# Patient Record
Sex: FEMALE | URBAN - METROPOLITAN AREA
[De-identification: names, ages, dates, MRNs, and addresses within clinical notes are randomized per-mention and may not be internally consistent; named-entity substitution may affect disease eponyms.]

---

## 2023-04-28 ENCOUNTER — APPOINTMENT (RX ONLY)
Dept: URBAN - METROPOLITAN AREA CLINIC 15 | Facility: CLINIC | Age: 58
Setting detail: DERMATOLOGY
End: 2023-04-28

## 2023-04-28 DIAGNOSIS — D18.0 HEMANGIOMA: ICD-10-CM

## 2023-04-28 DIAGNOSIS — L82.1 OTHER SEBORRHEIC KERATOSIS: ICD-10-CM

## 2023-04-28 DIAGNOSIS — L81.4 OTHER MELANIN HYPERPIGMENTATION: ICD-10-CM

## 2023-04-28 DIAGNOSIS — D22 MELANOCYTIC NEVI: ICD-10-CM

## 2023-04-28 PROBLEM — D22.39 MELANOCYTIC NEVI OF OTHER PARTS OF FACE: Status: ACTIVE | Noted: 2023-04-28

## 2023-04-28 PROBLEM — D22.62 MELANOCYTIC NEVI OF LEFT UPPER LIMB, INCLUDING SHOULDER: Status: ACTIVE | Noted: 2023-04-28

## 2023-04-28 PROBLEM — D22.5 MELANOCYTIC NEVI OF TRUNK: Status: ACTIVE | Noted: 2023-04-28

## 2023-04-28 PROBLEM — D48.5 NEOPLASM OF UNCERTAIN BEHAVIOR OF SKIN: Status: ACTIVE | Noted: 2023-04-28

## 2023-04-28 PROBLEM — D18.01 HEMANGIOMA OF SKIN AND SUBCUTANEOUS TISSUE: Status: ACTIVE | Noted: 2023-04-28

## 2023-04-28 PROBLEM — D22.61 MELANOCYTIC NEVI OF RIGHT UPPER LIMB, INCLUDING SHOULDER: Status: ACTIVE | Noted: 2023-04-28

## 2023-04-28 PROCEDURE — 11102 TANGNTL BX SKIN SINGLE LES: CPT

## 2023-04-28 PROCEDURE — ? BIOPSY BY SHAVE METHOD

## 2023-04-28 PROCEDURE — 11103 TANGNTL BX SKIN EA SEP/ADDL: CPT

## 2023-04-28 PROCEDURE — ? COUNSELING

## 2023-04-28 PROCEDURE — 99203 OFFICE O/P NEW LOW 30 MIN: CPT | Mod: 25

## 2023-04-28 ASSESSMENT — LOCATION DETAILED DESCRIPTION DERM
LOCATION DETAILED: LEFT LATERAL ABDOMEN
LOCATION DETAILED: RIGHT ANTERIOR DISTAL UPPER ARM
LOCATION DETAILED: RIGHT ANTECUBITAL SKIN
LOCATION DETAILED: PERIUMBILICAL SKIN
LOCATION DETAILED: LOWER STERNUM
LOCATION DETAILED: RIGHT INFERIOR ANTERIOR NECK
LOCATION DETAILED: RIGHT VENTRAL PROXIMAL FOREARM
LOCATION DETAILED: RIGHT MEDIAL UPPER BACK
LOCATION DETAILED: LEFT INFERIOR MEDIAL FOREHEAD
LOCATION DETAILED: LEFT VENTRAL PROXIMAL FOREARM
LOCATION DETAILED: LEFT ANTECUBITAL SKIN
LOCATION DETAILED: SUBXIPHOID
LOCATION DETAILED: RIGHT INFERIOR FOREHEAD
LOCATION DETAILED: RIGHT INFERIOR MEDIAL UPPER BACK
LOCATION DETAILED: LEFT CLAVICULAR NECK
LOCATION DETAILED: RIGHT INFERIOR UPPER BACK
LOCATION DETAILED: SUPERIOR LUMBAR SPINE
LOCATION DETAILED: LEFT VENTRAL DISTAL FOREARM
LOCATION DETAILED: LEFT INFERIOR LATERAL NECK
LOCATION DETAILED: RIGHT CHIN

## 2023-04-28 ASSESSMENT — LOCATION SIMPLE DESCRIPTION DERM
LOCATION SIMPLE: CHEST
LOCATION SIMPLE: LEFT FOREHEAD
LOCATION SIMPLE: LEFT UPPER ARM
LOCATION SIMPLE: LEFT FOREARM
LOCATION SIMPLE: CHIN
LOCATION SIMPLE: RIGHT FOREARM
LOCATION SIMPLE: RIGHT UPPER BACK
LOCATION SIMPLE: ABDOMEN
LOCATION SIMPLE: RIGHT UPPER ARM
LOCATION SIMPLE: LOWER BACK
LOCATION SIMPLE: LEFT ANTERIOR NECK
LOCATION SIMPLE: RIGHT ANTERIOR NECK
LOCATION SIMPLE: RIGHT FOREHEAD

## 2023-04-28 ASSESSMENT — LOCATION ZONE DERM
LOCATION ZONE: ARM
LOCATION ZONE: NECK
LOCATION ZONE: FACE
LOCATION ZONE: TRUNK

## 2023-04-28 NOTE — PROCEDURE: BIOPSY BY SHAVE METHOD
Detail Level: Detailed
Depth Of Biopsy: dermis
Was A Bandage Applied: Yes
Size Of Lesion In Cm: 0.8
X Size Of Lesion In Cm: 0
Biopsy Type: H and E
Biopsy Method: Personna blade
Anesthesia Type: 2% lidocaine with epinephrine and a 1:10 solution of 8.4% sodium bicarbonate
Anesthesia Volume In Cc: 1
Hemostasis: Electrocautery
Wound Care: Vaseline
Dressing: Band-Aid
Destruction After The Procedure: No
Type Of Destruction Used: Electrodesiccation
Curettage Text: The wound bed was treated with curettage after the biopsy was performed.
Cryotherapy Text: The wound bed was treated with cryotherapy after the biopsy was performed.
Electrodesiccation Text: The wound bed was treated with electrodesiccation after the biopsy was performed.
Electrodesiccation And Curettage Text: The wound bed was treated with electrodesiccation and curettage after the biopsy was performed.
Silver Nitrate Text: The wound bed was treated with silver nitrate after the biopsy was performed.
Lab: [id number]
Lab Facility: 
Consent: Verbal consent was obtained and risks were reviewed including but not limited to scarring, infection, bleeding, scabbing, incomplete removal, nerve damage and allergy to anesthesia.
Post-Care Instructions: I reviewed with the patient in detail post-care instructions. Patient is to keep the biopsy site dry overnight, and then apply bacitracin/petroleum  twice daily until healed. Patient may apply hydrogen peroxide soaks to remove any crusting.
Notification Instructions: Patient will be notified of biopsy results. However, patient instructed to call the office if not contacted within 2 weeks.
Billing Type: Third-Party Bill
Information: Selecting Yes will display possible errors in your note based on the variables you have selected. This validation is only offered as a suggestion for you. PLEASE NOTE THAT THE VALIDATION TEXT WILL BE REMOVED WHEN YOU FINALIZE YOUR NOTE. IF YOU WANT TO FAX A PRELIMINARY NOTE YOU WILL NEED TO TOGGLE THIS TO 'NO' IF YOU DO NOT WANT IT IN YOUR FAXED NOTE.
Size Of Lesion In Cm: 0.4
Size Of Lesion In Cm: 0.7
Anesthesia Type: 1% lidocaine with epinephrine
Size Of Lesion In Cm: 0.6
Consent: Written consent was obtained and risks were reviewed including but not limited to scarring, infection, bleeding, scabbing, incomplete removal, nerve damage and allergy to anesthesia.

## 2023-06-26 ENCOUNTER — OFFICE VISIT (OUTPATIENT)
Dept: URGENT CARE | Facility: CLINIC | Age: 58
End: 2023-06-26
Payer: COMMERCIAL

## 2023-06-26 VITALS
OXYGEN SATURATION: 96 % | DIASTOLIC BLOOD PRESSURE: 124 MMHG | HEART RATE: 82 BPM | SYSTOLIC BLOOD PRESSURE: 168 MMHG | BODY MASS INDEX: 31.39 KG/M2 | WEIGHT: 200 LBS | RESPIRATION RATE: 18 BRPM | HEIGHT: 67 IN | TEMPERATURE: 98.2 F

## 2023-06-26 DIAGNOSIS — R03.0 ELEVATED BLOOD PRESSURE READING: ICD-10-CM

## 2023-06-26 DIAGNOSIS — R51.9 NONINTRACTABLE HEADACHE, UNSPECIFIED CHRONICITY PATTERN, UNSPECIFIED HEADACHE TYPE: ICD-10-CM

## 2023-06-26 LAB
FLUAV RNA SPEC QL NAA+PROBE: NEGATIVE
FLUBV RNA SPEC QL NAA+PROBE: NEGATIVE
RSV RNA SPEC QL NAA+PROBE: NEGATIVE
SARS-COV-2 RNA RESP QL NAA+PROBE: NEGATIVE

## 2023-06-26 PROCEDURE — 3077F SYST BP >= 140 MM HG: CPT | Performed by: NURSE PRACTITIONER

## 2023-06-26 PROCEDURE — 99203 OFFICE O/P NEW LOW 30 MIN: CPT | Performed by: NURSE PRACTITIONER

## 2023-06-26 PROCEDURE — 0241U POCT CEPHEID COV-2, FLU A/B, RSV - PCR: CPT | Performed by: NURSE PRACTITIONER

## 2023-06-26 PROCEDURE — 3080F DIAST BP >= 90 MM HG: CPT | Performed by: NURSE PRACTITIONER

## 2023-06-26 RX ORDER — METHYLPREDNISOLONE 4 MG/1
TABLET ORAL
Qty: 21 TABLET | Refills: 0 | Status: ON HOLD | OUTPATIENT
Start: 2023-06-26 | End: 2023-06-30

## 2023-06-26 RX ORDER — RIZATRIPTAN BENZOATE 10 MG/1
10 TABLET ORAL
Status: ON HOLD | COMMUNITY
Start: 2023-04-19 | End: 2023-08-19

## 2023-06-29 ENCOUNTER — HOSPITAL ENCOUNTER (OUTPATIENT)
Facility: MEDICAL CENTER | Age: 58
End: 2023-06-30
Attending: EMERGENCY MEDICINE | Admitting: HOSPITALIST
Payer: COMMERCIAL

## 2023-06-29 ENCOUNTER — APPOINTMENT (OUTPATIENT)
Dept: RADIOLOGY | Facility: MEDICAL CENTER | Age: 58
End: 2023-06-29
Attending: EMERGENCY MEDICINE
Payer: COMMERCIAL

## 2023-06-29 DIAGNOSIS — R07.9 CHEST PAIN, UNSPECIFIED TYPE: ICD-10-CM

## 2023-06-29 PROBLEM — G43.909 MIGRAINE: Status: ACTIVE | Noted: 2023-06-29

## 2023-06-29 PROBLEM — I16.0 HYPERTENSIVE URGENCY: Status: ACTIVE | Noted: 2023-06-29

## 2023-06-29 PROBLEM — D72.819 LEUKOPENIA: Status: ACTIVE | Noted: 2023-06-29

## 2023-06-29 LAB
ALBUMIN SERPL BCP-MCNC: 4.2 G/DL (ref 3.2–4.9)
ALBUMIN/GLOB SERPL: 1.5 G/DL
ALP SERPL-CCNC: 76 U/L (ref 30–99)
ALT SERPL-CCNC: 14 U/L (ref 2–50)
AMPHET UR QL SCN: NEGATIVE
ANION GAP SERPL CALC-SCNC: 14 MMOL/L (ref 7–16)
AST SERPL-CCNC: 14 U/L (ref 12–45)
BARBITURATES UR QL SCN: NEGATIVE
BASOPHILS # BLD AUTO: 0.3 % (ref 0–1.8)
BASOPHILS # BLD: 0.01 K/UL (ref 0–0.12)
BENZODIAZ UR QL SCN: NEGATIVE
BILIRUB SERPL-MCNC: 0.2 MG/DL (ref 0.1–1.5)
BUN SERPL-MCNC: 18 MG/DL (ref 8–22)
BZE UR QL SCN: NEGATIVE
CALCIUM ALBUM COR SERPL-MCNC: 9 MG/DL (ref 8.5–10.5)
CALCIUM SERPL-MCNC: 9.2 MG/DL (ref 8.4–10.2)
CANNABINOIDS UR QL SCN: NEGATIVE
CHLORIDE SERPL-SCNC: 104 MMOL/L (ref 96–112)
CO2 SERPL-SCNC: 21 MMOL/L (ref 20–33)
CREAT SERPL-MCNC: 0.61 MG/DL (ref 0.5–1.4)
EKG IMPRESSION: NORMAL
EOSINOPHIL # BLD AUTO: 0.04 K/UL (ref 0–0.51)
EOSINOPHIL NFR BLD: 1.2 % (ref 0–6.9)
ERYTHROCYTE [DISTWIDTH] IN BLOOD BY AUTOMATED COUNT: 45.7 FL (ref 35.9–50)
GFR SERPLBLD CREATININE-BSD FMLA CKD-EPI: 105 ML/MIN/1.73 M 2
GLOBULIN SER CALC-MCNC: 2.8 G/DL (ref 1.9–3.5)
GLUCOSE SERPL-MCNC: 100 MG/DL (ref 65–99)
HCT VFR BLD AUTO: 39.8 % (ref 37–47)
HGB BLD-MCNC: 13.2 G/DL (ref 12–16)
IMM GRANULOCYTES # BLD AUTO: 0.01 K/UL (ref 0–0.11)
IMM GRANULOCYTES NFR BLD AUTO: 0.3 % (ref 0–0.9)
LYMPHOCYTES # BLD AUTO: 1.07 K/UL (ref 1–4.8)
LYMPHOCYTES NFR BLD: 32.4 % (ref 22–41)
MAGNESIUM SERPL-MCNC: 2 MG/DL (ref 1.5–2.5)
MCH RBC QN AUTO: 29.9 PG (ref 27–33)
MCHC RBC AUTO-ENTMCNC: 33.2 G/DL (ref 32.2–35.5)
MCV RBC AUTO: 90 FL (ref 81.4–97.8)
METHADONE UR QL SCN: NEGATIVE
MONOCYTES # BLD AUTO: 0.45 K/UL (ref 0–0.85)
MONOCYTES NFR BLD AUTO: 13.6 % (ref 0–13.4)
NEUTROPHILS # BLD AUTO: 1.72 K/UL (ref 1.82–7.42)
NEUTROPHILS NFR BLD: 52.2 % (ref 44–72)
NRBC # BLD AUTO: 0 K/UL
NRBC BLD-RTO: 0 /100 WBC (ref 0–0.2)
NT-PROBNP SERPL IA-MCNC: 119 PG/ML (ref 0–125)
OPIATES UR QL SCN: NEGATIVE
OXYCODONE UR QL SCN: NEGATIVE
PCP UR QL SCN: NEGATIVE
PHOSPHATE SERPL-MCNC: 3 MG/DL (ref 2.5–4.5)
PLATELET # BLD AUTO: 193 K/UL (ref 164–446)
PMV BLD AUTO: 9.8 FL (ref 9–12.9)
POTASSIUM SERPL-SCNC: 3.8 MMOL/L (ref 3.6–5.5)
PROPOXYPH UR QL SCN: NEGATIVE
PROT SERPL-MCNC: 7 G/DL (ref 6–8.2)
RBC # BLD AUTO: 4.42 M/UL (ref 4.2–5.4)
SODIUM SERPL-SCNC: 139 MMOL/L (ref 135–145)
TROPONIN T SERPL-MCNC: 6 NG/L (ref 6–19)
TROPONIN T SERPL-MCNC: 7 NG/L (ref 6–19)
WBC # BLD AUTO: 3.3 K/UL (ref 4.8–10.8)

## 2023-06-29 PROCEDURE — 99285 EMERGENCY DEPT VISIT HI MDM: CPT

## 2023-06-29 PROCEDURE — 93005 ELECTROCARDIOGRAM TRACING: CPT

## 2023-06-29 PROCEDURE — 85025 COMPLETE CBC W/AUTO DIFF WBC: CPT

## 2023-06-29 PROCEDURE — 71045 X-RAY EXAM CHEST 1 VIEW: CPT

## 2023-06-29 PROCEDURE — 700102 HCHG RX REV CODE 250 W/ 637 OVERRIDE(OP): Performed by: HOSPITALIST

## 2023-06-29 PROCEDURE — 94760 N-INVAS EAR/PLS OXIMETRY 1: CPT

## 2023-06-29 PROCEDURE — 84100 ASSAY OF PHOSPHORUS: CPT

## 2023-06-29 PROCEDURE — 83880 ASSAY OF NATRIURETIC PEPTIDE: CPT

## 2023-06-29 PROCEDURE — 93005 ELECTROCARDIOGRAM TRACING: CPT | Performed by: EMERGENCY MEDICINE

## 2023-06-29 PROCEDURE — 96375 TX/PRO/DX INJ NEW DRUG ADDON: CPT

## 2023-06-29 PROCEDURE — 96372 THER/PROPH/DIAG INJ SC/IM: CPT

## 2023-06-29 PROCEDURE — G0378 HOSPITAL OBSERVATION PER HR: HCPCS

## 2023-06-29 PROCEDURE — 80053 COMPREHEN METABOLIC PANEL: CPT

## 2023-06-29 PROCEDURE — 700101 HCHG RX REV CODE 250: Performed by: EMERGENCY MEDICINE

## 2023-06-29 PROCEDURE — 36415 COLL VENOUS BLD VENIPUNCTURE: CPT

## 2023-06-29 PROCEDURE — A9270 NON-COVERED ITEM OR SERVICE: HCPCS | Performed by: HOSPITALIST

## 2023-06-29 PROCEDURE — 84484 ASSAY OF TROPONIN QUANT: CPT

## 2023-06-29 PROCEDURE — 80307 DRUG TEST PRSMV CHEM ANLYZR: CPT

## 2023-06-29 PROCEDURE — 83735 ASSAY OF MAGNESIUM: CPT

## 2023-06-29 PROCEDURE — 99223 1ST HOSP IP/OBS HIGH 75: CPT | Performed by: HOSPITALIST

## 2023-06-29 PROCEDURE — 96374 THER/PROPH/DIAG INJ IV PUSH: CPT

## 2023-06-29 PROCEDURE — 700111 HCHG RX REV CODE 636 W/ 250 OVERRIDE (IP): Mod: JZ | Performed by: HOSPITALIST

## 2023-06-29 RX ORDER — AMLODIPINE BESYLATE 5 MG/1
5 TABLET ORAL EVERY EVENING
Status: DISCONTINUED | OUTPATIENT
Start: 2023-06-29 | End: 2023-06-30

## 2023-06-29 RX ORDER — ONDANSETRON 2 MG/ML
4 INJECTION INTRAMUSCULAR; INTRAVENOUS EVERY 4 HOURS PRN
Status: DISCONTINUED | OUTPATIENT
Start: 2023-06-29 | End: 2023-06-30 | Stop reason: HOSPADM

## 2023-06-29 RX ORDER — METOPROLOL SUCCINATE 50 MG/1
50 TABLET, EXTENDED RELEASE ORAL DAILY
Status: ON HOLD | COMMUNITY
End: 2023-06-30

## 2023-06-29 RX ORDER — PROMETHAZINE HYDROCHLORIDE 25 MG/1
12.5-25 TABLET ORAL EVERY 4 HOURS PRN
Status: DISCONTINUED | OUTPATIENT
Start: 2023-06-29 | End: 2023-06-30 | Stop reason: HOSPADM

## 2023-06-29 RX ORDER — ENOXAPARIN SODIUM 100 MG/ML
40 INJECTION SUBCUTANEOUS DAILY
Status: DISCONTINUED | OUTPATIENT
Start: 2023-06-29 | End: 2023-06-30 | Stop reason: HOSPADM

## 2023-06-29 RX ORDER — ACETAMINOPHEN 325 MG/1
650 TABLET ORAL EVERY 6 HOURS PRN
Status: DISCONTINUED | OUTPATIENT
Start: 2023-06-29 | End: 2023-06-30 | Stop reason: HOSPADM

## 2023-06-29 RX ORDER — ASPIRIN 81 MG/1
324 TABLET, CHEWABLE ORAL DAILY
Status: DISCONTINUED | OUTPATIENT
Start: 2023-06-30 | End: 2023-06-30 | Stop reason: HOSPADM

## 2023-06-29 RX ORDER — PROMETHAZINE HYDROCHLORIDE 25 MG/1
12.5-25 SUPPOSITORY RECTAL EVERY 4 HOURS PRN
Status: DISCONTINUED | OUTPATIENT
Start: 2023-06-29 | End: 2023-06-30 | Stop reason: HOSPADM

## 2023-06-29 RX ORDER — POLYETHYLENE GLYCOL 3350 17 G/17G
1 POWDER, FOR SOLUTION ORAL
Status: DISCONTINUED | OUTPATIENT
Start: 2023-06-29 | End: 2023-06-30 | Stop reason: HOSPADM

## 2023-06-29 RX ORDER — METOPROLOL SUCCINATE 25 MG/1
50 TABLET, EXTENDED RELEASE ORAL DAILY
Status: DISCONTINUED | OUTPATIENT
Start: 2023-06-30 | End: 2023-06-30 | Stop reason: HOSPADM

## 2023-06-29 RX ORDER — ASPIRIN 325 MG
325 TABLET ORAL DAILY
Status: DISCONTINUED | OUTPATIENT
Start: 2023-06-30 | End: 2023-06-30 | Stop reason: HOSPADM

## 2023-06-29 RX ORDER — PROCHLORPERAZINE EDISYLATE 5 MG/ML
5-10 INJECTION INTRAMUSCULAR; INTRAVENOUS EVERY 4 HOURS PRN
Status: DISCONTINUED | OUTPATIENT
Start: 2023-06-29 | End: 2023-06-30 | Stop reason: HOSPADM

## 2023-06-29 RX ORDER — NITROGLYCERIN 0.4 MG/1
0.4 TABLET SUBLINGUAL
Status: DISCONTINUED | OUTPATIENT
Start: 2023-06-29 | End: 2023-06-30 | Stop reason: HOSPADM

## 2023-06-29 RX ORDER — HYDROCHLOROTHIAZIDE 12.5 MG/1
12.5 TABLET ORAL
Status: DISCONTINUED | OUTPATIENT
Start: 2023-06-29 | End: 2023-06-30 | Stop reason: HOSPADM

## 2023-06-29 RX ORDER — REGADENOSON 0.08 MG/ML
0.4 INJECTION, SOLUTION INTRAVENOUS
Status: COMPLETED | OUTPATIENT
Start: 2023-06-29 | End: 2023-06-30

## 2023-06-29 RX ORDER — LABETALOL HYDROCHLORIDE 5 MG/ML
10 INJECTION, SOLUTION INTRAVENOUS ONCE
Status: DISCONTINUED | OUTPATIENT
Start: 2023-06-29 | End: 2023-06-29

## 2023-06-29 RX ORDER — LABETALOL HYDROCHLORIDE 5 MG/ML
10 INJECTION, SOLUTION INTRAVENOUS EVERY 4 HOURS PRN
Status: DISCONTINUED | OUTPATIENT
Start: 2023-06-29 | End: 2023-06-30 | Stop reason: HOSPADM

## 2023-06-29 RX ORDER — ONDANSETRON 4 MG/1
4 TABLET, ORALLY DISINTEGRATING ORAL EVERY 4 HOURS PRN
Status: DISCONTINUED | OUTPATIENT
Start: 2023-06-29 | End: 2023-06-30 | Stop reason: HOSPADM

## 2023-06-29 RX ORDER — AMINOPHYLLINE 25 MG/ML
100 INJECTION, SOLUTION INTRAVENOUS
Status: DISCONTINUED | OUTPATIENT
Start: 2023-06-29 | End: 2023-06-30

## 2023-06-29 RX ORDER — AMOXICILLIN 250 MG
2 CAPSULE ORAL 2 TIMES DAILY
Status: DISCONTINUED | OUTPATIENT
Start: 2023-06-29 | End: 2023-06-30 | Stop reason: HOSPADM

## 2023-06-29 RX ORDER — BISACODYL 10 MG
10 SUPPOSITORY, RECTAL RECTAL
Status: DISCONTINUED | OUTPATIENT
Start: 2023-06-29 | End: 2023-06-30 | Stop reason: HOSPADM

## 2023-06-29 RX ORDER — LABETALOL HYDROCHLORIDE 5 MG/ML
5 INJECTION, SOLUTION INTRAVENOUS ONCE
Status: COMPLETED | OUTPATIENT
Start: 2023-06-29 | End: 2023-06-29

## 2023-06-29 RX ORDER — RIZATRIPTAN BENZOATE 10 MG/1
10 TABLET ORAL
Status: DISCONTINUED | OUTPATIENT
Start: 2023-06-29 | End: 2023-06-29

## 2023-06-29 RX ORDER — GUAIFENESIN 200 MG/10ML
10 LIQUID ORAL EVERY 4 HOURS PRN
Status: DISCONTINUED | OUTPATIENT
Start: 2023-06-29 | End: 2023-06-30 | Stop reason: HOSPADM

## 2023-06-29 RX ORDER — IBUPROFEN 200 MG
200 TABLET ORAL ONCE
Status: ON HOLD | COMMUNITY
End: 2023-08-19

## 2023-06-29 RX ORDER — ASPIRIN 300 MG/1
300 SUPPOSITORY RECTAL DAILY
Status: DISCONTINUED | OUTPATIENT
Start: 2023-06-30 | End: 2023-06-30 | Stop reason: HOSPADM

## 2023-06-29 RX ORDER — CLONIDINE HYDROCHLORIDE 0.1 MG/1
0.1 TABLET ORAL EVERY 6 HOURS PRN
Status: DISCONTINUED | OUTPATIENT
Start: 2023-06-29 | End: 2023-06-30 | Stop reason: HOSPADM

## 2023-06-29 RX ADMIN — LABETALOL HYDROCHLORIDE 5 MG: 5 INJECTION INTRAVENOUS at 17:28

## 2023-06-29 RX ADMIN — AMLODIPINE BESYLATE 5 MG: 5 TABLET ORAL at 20:21

## 2023-06-29 RX ADMIN — HYDROCHLOROTHIAZIDE 12.5 MG: 12.5 TABLET ORAL at 20:21

## 2023-06-29 RX ADMIN — ENOXAPARIN SODIUM 40 MG: 100 INJECTION SUBCUTANEOUS at 20:21

## 2023-06-29 RX ADMIN — SENNOSIDES AND DOCUSATE SODIUM 2 TABLET: 50; 8.6 TABLET ORAL at 20:21

## 2023-06-29 ASSESSMENT — ENCOUNTER SYMPTOMS
HEARTBURN: 0
PALPITATIONS: 0
NAUSEA: 0
COUGH: 0
FEVER: 0
HEADACHES: 1
BRUISES/BLEEDS EASILY: 0
ABDOMINAL PAIN: 0
DEPRESSION: 0
NERVOUS/ANXIOUS: 1
CHILLS: 0
VOMITING: 0
SEIZURES: 0
DOUBLE VISION: 0
LOSS OF CONSCIOUSNESS: 0
HEADACHES: 1
WHEEZING: 0
DIARRHEA: 0
MUSCULOSKELETAL NEGATIVE: 1
COUGH: 1
EYES NEGATIVE: 1
GASTROINTESTINAL NEGATIVE: 1
RESPIRATORY NEGATIVE: 1
HEMOPTYSIS: 0
CONSTIPATION: 0
BLOOD IN STOOL: 0
DIAPHORESIS: 0
FOCAL WEAKNESS: 0
DIZZINESS: 0

## 2023-06-29 ASSESSMENT — LIFESTYLE VARIABLES
ON A TYPICAL DAY WHEN YOU DRINK ALCOHOL HOW MANY DRINKS DO YOU HAVE: 1
HAVE YOU EVER FELT YOU SHOULD CUT DOWN ON YOUR DRINKING: NO
EVER HAD A DRINK FIRST THING IN THE MORNING TO STEADY YOUR NERVES TO GET RID OF A HANGOVER: NO
SUBSTANCE_ABUSE: 0
TOTAL SCORE: 0
HAVE PEOPLE ANNOYED YOU BY CRITICIZING YOUR DRINKING: NO
DO YOU DRINK ALCOHOL: NO
HOW MANY TIMES IN THE PAST YEAR HAVE YOU HAD 5 OR MORE DRINKS IN A DAY: 0
ALCOHOL_USE: YES
TOTAL SCORE: 0
AVERAGE NUMBER OF DAYS PER WEEK YOU HAVE A DRINK CONTAINING ALCOHOL: 0.25
EVER FELT BAD OR GUILTY ABOUT YOUR DRINKING: NO
CONSUMPTION TOTAL: NEGATIVE
TOTAL SCORE: 0

## 2023-06-29 ASSESSMENT — COGNITIVE AND FUNCTIONAL STATUS - GENERAL
SUGGESTED CMS G CODE MODIFIER DAILY ACTIVITY: CH
DAILY ACTIVITIY SCORE: 24

## 2023-06-29 ASSESSMENT — FIBROSIS 4 INDEX: FIB4 SCORE: 1.07

## 2023-06-29 ASSESSMENT — PATIENT HEALTH QUESTIONNAIRE - PHQ9
2. FEELING DOWN, DEPRESSED, IRRITABLE, OR HOPELESS: NOT AT ALL
1. LITTLE INTEREST OR PLEASURE IN DOING THINGS: NOT AT ALL
SUM OF ALL RESPONSES TO PHQ9 QUESTIONS 1 AND 2: 0

## 2023-06-29 ASSESSMENT — VISUAL ACUITY: OU: 1

## 2023-06-29 ASSESSMENT — PAIN DESCRIPTION - PAIN TYPE: TYPE: ACUTE PAIN

## 2023-06-29 NOTE — ED TRIAGE NOTES
"Chief Complaint   Patient presents with    Chest Pressure    Blood Pressure Problem     58 yo female ambulates to triage with reports of elevated BP since Monday.  Patient was seen at  on Monday told to follow up with primary about BP and feels like it is not controlled.  Today reports she also developed some non radiating chest pressure.  Reports some SOB but has had a cold and headache which is what she was seen at the  for.      BP (!) 191/124   Pulse 70   Temp 36.8 °C (98.3 °F) (Temporal)   Resp 18   Ht 1.676 m (5' 6\")   Wt 103 kg (227 lb 15.3 oz)   SpO2 95%   BMI 36.79 kg/m²    "

## 2023-06-29 NOTE — PROGRESS NOTES
"Subjective     Gely Pantoja is a 57 y.o. female who presents with Headache (X2days, /Negative home covid test, sore throat, sinus pressure, coughing, sick for 3 days, )            Headache  Headache pattern: pt reports new onset of HA that started 2 days ago. she felt like this HA was similar to when she had COVID last year. she had a negative home COVID test today. she has been taking OTC meds for her HA w/o relief. no aura. admits her BP is always elevated.  Providers seen: reports she has seen her PCP several times and he has advised her to just \"watch her BP\" even though she states it has been very elevated every time she sees him.  Time of day symptoms are worse:  No specific time of day  Laterality:  Left side only  Headaches last more than three days?: Yes (admits this HA feels similar to previous HAs)    Abortive medications tried:  Acetaminophen and ibuprofen      Review of Systems   Constitutional:  Positive for malaise/fatigue.   Respiratory:  Positive for cough.    Neurological:  Positive for headaches.   All other systems reviewed and are negative.         History reviewed. No pertinent past medical history. History reviewed. No pertinent surgical history.   Social History     Socioeconomic History    Marital status:      Spouse name: Not on file    Number of children: Not on file    Years of education: Not on file    Highest education level: Not on file   Occupational History    Not on file   Tobacco Use    Smoking status: Never    Smokeless tobacco: Never   Vaping Use    Vaping Use: Never used   Substance and Sexual Activity    Alcohol use: Yes    Drug use: Never    Sexual activity: Not on file   Other Topics Concern    Not on file   Social History Narrative    Not on file     Social Determinants of Health     Financial Resource Strain: Not on file   Food Insecurity: Not on file   Transportation Needs: Not on file   Physical Activity: Not on file   Stress: Not on file   Social Connections: " "Not on file   Intimate Partner Violence: Not on file   Housing Stability: Not on file         Objective     BP (!) 168/124   Pulse 82   Temp 36.8 °C (98.2 °F) (Temporal)   Resp 18   Ht 1.702 m (5' 7\")   Wt 90.7 kg (200 lb)   SpO2 96%   BMI 31.32 kg/m²      Physical Exam  Vitals and nursing note reviewed.   Constitutional:       Appearance: Normal appearance. She is normal weight.   HENT:      Head: Normocephalic and atraumatic.      Nose: Nose normal.      Mouth/Throat:      Mouth: Mucous membranes are moist.      Pharynx: Oropharynx is clear.   Eyes:      Extraocular Movements: Extraocular movements intact.      Pupils: Pupils are equal, round, and reactive to light.   Cardiovascular:      Rate and Rhythm: Normal rate and regular rhythm.   Pulmonary:      Effort: Pulmonary effort is normal.      Breath sounds: Normal breath sounds.   Musculoskeletal:         General: Normal range of motion.      Cervical back: Normal range of motion.   Skin:     General: Skin is warm and dry.      Capillary Refill: Capillary refill takes less than 2 seconds.   Neurological:      General: No focal deficit present.      Mental Status: She is alert and oriented to person, place, and time. Mental status is at baseline.   Psychiatric:         Mood and Affect: Mood normal.         Speech: Speech normal.         Thought Content: Thought content normal.         Judgment: Judgment normal.                             Assessment & Plan        1. Nonintractable headache, unspecified chronicity pattern, unspecified headache type  - POCT CoV-2, Flu A/B, RSV by PCR  - methylPREDNISolone (MEDROL DOSEPAK) 4 MG Tablet Therapy Pack; Follow schedule on package instructions.  Dispense: 21 Tablet; Refill: 0  - Rimegepant Sulfate 75 MG TABLET DISPERSIBLE; Take 1 Tablet by mouth every day.  Dispense: 10 Tablet; Refill: 0    2. Elevated blood pressure reading          POC testing negative today  Discussed with patient her HA could be secondary to a " recent illness or it could be secondary to her elevated BP  It is concerning that she has been told several times by her PCP that her high BP reading will just be watched. I have encouraged her to start a BP diary and monitor at home  I would like for her to then follow up with her PCP to show her recent BP readings. If they are consistently high, she needs to consider starting on medication therapy. She understands  Red flags discussed and when to seek care in the ER  Supportive care, differential diagnoses, and indications for immediate follow-up discussed with patient.    Pathogenesis of diagnosis discussed including typical length and natural progression.    Instructed to return to  or nearest emergency department if symptoms fail to improve, for any change in condition, further concerns, or new concerning symptoms.  Patient states understanding of the plan of care and discharge instructions.

## 2023-06-29 NOTE — ED PROVIDER NOTES
ED Provider Note    CHIEF COMPLAINT  Chief Complaint   Patient presents with    Chest Pressure    Blood Pressure Problem     58 yo female ambulates to triage with reports of elevated BP since Monday.  Patient was seen at  on Monday told to follow up with primary about BP and feels like it is not controlled.  Today reports she also developed some non radiating chest pressure.  Reports some SOB but has had a cold and headache which is what she was seen at the  for.         EXTERNAL RECORDS REVIEWED  No old notes for review    HPI/ROS    OUTSIDE HISTORIAN(S):  Family patient's  is bedside adding to history and review of systems stating the patient's had chest pressure throughout the day today as well as intermittent elevated blood pressures requiring hospital visitations at [facility]    Gely Pantoja is a 57 y.o. female who presents with complaint of elevated blood pressure.  Here in the emerged part, the patient states that she was    PAST MEDICAL HISTORY       SURGICAL HISTORY  patient denies any surgical history    FAMILY HISTORY  History reviewed. No pertinent family history.    SOCIAL HISTORY  Social History     Tobacco Use    Smoking status: Never    Smokeless tobacco: Never   Vaping Use    Vaping Use: Never used   Substance and Sexual Activity    Alcohol use: Yes    Drug use: Never    Sexual activity: Not on file       CURRENT MEDICATIONS  Home Medications       Reviewed by Kenn Rodrigues M.D. (Physician) on 06/29/23 at 1742  Med List Status: Complete     Medication Last Dose Status   asa/apap/caffeine (EXCEDRIN) 250-250-65 MG Tab 6/29/2023 Active   ibuprofen (MOTRIN) 200 MG Tab 6/29/2023 Active   methylPREDNISolone (MEDROL DOSEPAK) 4 MG Tablet Therapy Pack 6/29/2023 Active   metoprolol SR (TOPROL XL) 50 MG TABLET SR 24 HR 6/29/2023 Active   Rimegepant Sulfate 75 MG TABLET DISPERSIBLE 6/29/2023 Active   rizatriptan (MAXALT) 10 MG tablet 6/22/2023 Active                    ALLERGIES  No  "Known Allergies    PHYSICAL EXAM  VITAL SIGNS: BP (!) 180/99   Pulse 69   Temp 36.8 °C (98.3 °F) (Temporal)   Resp 20   Ht 1.676 m (5' 6\")   Wt 103 kg (227 lb 15.3 oz)   SpO2 95%   BMI 36.79 kg/m²      Nursing notes and vitals reviewed.  Constitutional: Well developed, Well nourished, No acute distress, Non-toxic appearance.   Eyes: PERRLA, EOMI, Conjunctiva normal, No discharge.   HENT: Normocephalic, Atraumatic, moist mucous membranes, no facial edema   Cardiovascular: Normal heart rate, Normal rhythm, No murmurs, No rubs, No gallops.   Thorax & Lungs: No respiratory distress, No rales, No rhonchi, No wheezing, No chest tenderness.   Abdomen: Bowel sounds normal, Soft, No tenderness, No guarding, No rebound, No masses, No pulsatile masses.   Skin: Warm, Dry, No erythema, No rash.   Extremities: No deformity, no pedal edema, good range of motion range of motion upper lower extremes bilaterally  Neurologic: Alert & oriented x 3, no focal abnormalities noted, acting appropriately on examination  Psychiatric: Affect normal for clinical presentation.      DIAGNOSTIC STUDIES / PROCEDURES  EKG  I have independently interpreted this EKG  Sinus rhythm on monitor    LABS  Results for orders placed or performed during the hospital encounter of 06/29/23   CBC w/ Differential   Result Value Ref Range    WBC 3.3 (L) 4.8 - 10.8 K/uL    RBC 4.42 4.20 - 5.40 M/uL    Hemoglobin 13.2 12.0 - 16.0 g/dL    Hematocrit 39.8 37.0 - 47.0 %    MCV 90.0 81.4 - 97.8 fL    MCH 29.9 27.0 - 33.0 pg    MCHC 33.2 32.2 - 35.5 g/dL    RDW 45.7 35.9 - 50.0 fL    Platelet Count 193 164 - 446 K/uL    MPV 9.8 9.0 - 12.9 fL    Neutrophils-Polys 52.20 44.00 - 72.00 %    Lymphocytes 32.40 22.00 - 41.00 %    Monocytes 13.60 (H) 0.00 - 13.40 %    Eosinophils 1.20 0.00 - 6.90 %    Basophils 0.30 0.00 - 1.80 %    Immature Granulocytes 0.30 0.00 - 0.90 %    Nucleated RBC 0.00 0.00 - 0.20 /100 WBC    Neutrophils (Absolute) 1.72 (L) 1.82 - 7.42 K/uL    " Lymphs (Absolute) 1.07 1.00 - 4.80 K/uL    Monos (Absolute) 0.45 0.00 - 0.85 K/uL    Eos (Absolute) 0.04 0.00 - 0.51 K/uL    Baso (Absolute) 0.01 0.00 - 0.12 K/uL    Immature Granulocytes (abs) 0.01 0.00 - 0.11 K/uL    NRBC (Absolute) 0.00 K/uL   Complete Metabolic Panel (CMP)   Result Value Ref Range    Sodium 139 135 - 145 mmol/L    Potassium 3.8 3.6 - 5.5 mmol/L    Chloride 104 96 - 112 mmol/L    Co2 21 20 - 33 mmol/L    Anion Gap 14.0 7.0 - 16.0    Glucose 100 (H) 65 - 99 mg/dL    Bun 18 8 - 22 mg/dL    Creatinine 0.61 0.50 - 1.40 mg/dL    Calcium 9.2 8.4 - 10.2 mg/dL    AST(SGOT) 14 12 - 45 U/L    ALT(SGPT) 14 2 - 50 U/L    Alkaline Phosphatase 76 30 - 99 U/L    Total Bilirubin 0.2 0.1 - 1.5 mg/dL    Albumin 4.2 3.2 - 4.9 g/dL    Total Protein 7.0 6.0 - 8.2 g/dL    Globulin 2.8 1.9 - 3.5 g/dL    A-G Ratio 1.5 g/dL   proBrain Natriuretic Peptide, NT   Result Value Ref Range    NT-proBNP 119 0 - 125 pg/mL   Phosphorus   Result Value Ref Range    Phosphorus 3.0 2.5 - 4.5 mg/dL   Magnesium   Result Value Ref Range    Magnesium 2.0 1.5 - 2.5 mg/dL   TROPONIN   Result Value Ref Range    Troponin T 7 6 - 19 ng/L   CORRECTED CALCIUM   Result Value Ref Range    Correct Calcium 9.0 8.5 - 10.5 mg/dL   ESTIMATED GFR   Result Value Ref Range    GFR (CKD-EPI) 105 >60 mL/min/1.73 m 2   EKG   Result Value Ref Range    Report       [facility] Emergency Dept.    Test Date:  2023  Pt Name:    CESAR PATTERSON              Department: [facility]  MRN:        [id number]                      Room:  Gender:     Female                       Technician: [id number]  :        [id number]                   Requested By:ELKE LE DO  Order #:    [id number]                    Reading MD: ELKE LE, DO    Measurements  Intervals                                Axis  Rate:       64                           P:          -21  OH:         162                          QRS:        -14  QRSD:       90                            T:          52  QT:         411  QTc:        424    Interpretive Statements  Sinus rhythm  No previous ECG available for comparison  Electronically Signed On 06- 19:06:08 PDT by ELKE LE, DO           RADIOLOGY  I have independently interpreted the diagnostic imaging associated with this visit and am waiting the final reading from the radiologist.   My preliminary interpretation is as follows: Chest x-ray is negative for infiltrate  Radiologist interpretation:   DX-CHEST-PORTABLE (1 VIEW)   Final Result      Upper normal heart size without edema identified      NM-CARDIAC STRESS TEST    (Results Pending)         COURSE & MEDICAL DECISION MAKING    ED Observation Status? Yes; I am placing the patient in to an observation status due to a diagnostic uncertainty as well as therapeutic intensity. Patient placed in observation status at 1525 PM, 6/29/2023.     Observation plan is as follows: IV, chest work pain work-up, management of hypertensive urgency    Upon Reevaluation, the patient's condition has: not improved; and will be escalated to hospitalization.    Patient discharged from ED Observation status at 1720 (Time) 6/29/23 (Date).     INITIAL ASSESSMENT, COURSE AND PLAN  Care Narrative: This is a pleasant 57 with chest pressure as well as elevated blood pressure.  The patient has been in and out of the hospital as well as her primary care physician trying to maintain a normal blood pressure.  Yesterday blood pressures in the 230 range after being metoprolol from her primary care physician.  Now she is having elevated blood pressure in the setting of left-sided chest pain.  Her pressure was liable here and it was elevated up to the 1 9200 range back down to 140s in the 180s.  She was able labetalol and continue to have elevated blood pressure.  Her EKG shows no ST elevation myocardial infarction, right heart strain, she has no clinical historical complaints consistent with aortic  dissection, aortic aneurysm pulm embolism with a negative PERC score.  The patient did have an elevated blood pressure and chest discomfort is concern for hypertensive urgency.  I discussed the patient with Dr. Ramos who graciously excepts hospitalization of this patient and will do a nuclear medicine stress test as well as manage her elevated blood pressures.    Evaluation prior to hospitalization at 1735, the patient's blood pressure continues to stay elevated 185/90 and for this reason I do believe patient requires hospitalization.  She has no endorgan damage today for chest discomfort.      DISPOSITION AND DISCUSSIONS  I have discussed management of the patient with the following physicians and OTTO's: Dr. Ramos for hospitalization      Decision tools and prescription drugs considered including, but not limited to: Did not complete a CT pulm angiogram as the patient has a negative PERC score    CRITICAL CARE  The very real possibilty of a deterioration of this patient's condition required the highest level of my preparedness for sudden, emergent intervention.  I provided critical care services, which included medication orders, frequent reevaluations of the patient's condition and response to treatment, ordering and reviewing test results, and discussing the case with various consultants.  The critical care time associated with the care of the patient was 35 minutes. Review chart for interventions. This time is exclusive of any other billable procedures.    FINAL DIAGNOSIS  Hypertensive urgency  Chest pain  Critical care time 35 minutes    Electronically signed by: Zion Palacio D.O., 6/29/2023 4:47 PM

## 2023-06-30 ENCOUNTER — APPOINTMENT (OUTPATIENT)
Dept: CARDIOLOGY | Facility: MEDICAL CENTER | Age: 58
End: 2023-06-30
Attending: INTERNAL MEDICINE
Payer: COMMERCIAL

## 2023-06-30 ENCOUNTER — APPOINTMENT (OUTPATIENT)
Dept: RADIOLOGY | Facility: MEDICAL CENTER | Age: 58
End: 2023-06-30
Attending: HOSPITALIST
Payer: COMMERCIAL

## 2023-06-30 VITALS
OXYGEN SATURATION: 96 % | HEIGHT: 66 IN | DIASTOLIC BLOOD PRESSURE: 85 MMHG | BODY MASS INDEX: 38.8 KG/M2 | WEIGHT: 241.4 LBS | TEMPERATURE: 98.1 F | SYSTOLIC BLOOD PRESSURE: 151 MMHG | RESPIRATION RATE: 18 BRPM | HEART RATE: 69 BPM

## 2023-06-30 LAB
CHOLEST SERPL-MCNC: 174 MG/DL (ref 100–199)
HDLC SERPL-MCNC: 58 MG/DL
LDLC SERPL CALC-MCNC: 95 MG/DL
LV EJECT FRACT  99904: 65
LV EJECT FRACT MOD 2C 99903: 71.4
LV EJECT FRACT MOD 4C 99902: 79.98
LV EJECT FRACT MOD BP 99901: 75.49
TRIGL SERPL-MCNC: 105 MG/DL (ref 0–149)

## 2023-06-30 PROCEDURE — 700102 HCHG RX REV CODE 250 W/ 637 OVERRIDE(OP): Performed by: INTERNAL MEDICINE

## 2023-06-30 PROCEDURE — G0378 HOSPITAL OBSERVATION PER HR: HCPCS

## 2023-06-30 PROCEDURE — A9270 NON-COVERED ITEM OR SERVICE: HCPCS | Performed by: HOSPITALIST

## 2023-06-30 PROCEDURE — 78452 HT MUSCLE IMAGE SPECT MULT: CPT | Mod: 26 | Performed by: INTERNAL MEDICINE

## 2023-06-30 PROCEDURE — A9270 NON-COVERED ITEM OR SERVICE: HCPCS | Performed by: INTERNAL MEDICINE

## 2023-06-30 PROCEDURE — 80061 LIPID PANEL: CPT

## 2023-06-30 PROCEDURE — 96375 TX/PRO/DX INJ NEW DRUG ADDON: CPT

## 2023-06-30 PROCEDURE — 96365 THER/PROPH/DIAG IV INF INIT: CPT

## 2023-06-30 PROCEDURE — 700102 HCHG RX REV CODE 250 W/ 637 OVERRIDE(OP): Performed by: HOSPITALIST

## 2023-06-30 PROCEDURE — 36415 COLL VENOUS BLD VENIPUNCTURE: CPT

## 2023-06-30 PROCEDURE — 93306 TTE W/DOPPLER COMPLETE: CPT | Mod: 26 | Performed by: INTERNAL MEDICINE

## 2023-06-30 PROCEDURE — 700111 HCHG RX REV CODE 636 W/ 250 OVERRIDE (IP): Performed by: HOSPITALIST

## 2023-06-30 PROCEDURE — 93306 TTE W/DOPPLER COMPLETE: CPT

## 2023-06-30 PROCEDURE — 93018 CV STRESS TEST I&R ONLY: CPT | Performed by: INTERNAL MEDICINE

## 2023-06-30 PROCEDURE — 99239 HOSP IP/OBS DSCHRG MGMT >30: CPT | Performed by: INTERNAL MEDICINE

## 2023-06-30 PROCEDURE — 96366 THER/PROPH/DIAG IV INF ADDON: CPT

## 2023-06-30 PROCEDURE — 78452 HT MUSCLE IMAGE SPECT MULT: CPT

## 2023-06-30 PROCEDURE — 700111 HCHG RX REV CODE 636 W/ 250 OVERRIDE (IP): Mod: JZ | Performed by: INTERNAL MEDICINE

## 2023-06-30 PROCEDURE — 94760 N-INVAS EAR/PLS OXIMETRY 1: CPT

## 2023-06-30 PROCEDURE — 700101 HCHG RX REV CODE 250: Performed by: HOSPITALIST

## 2023-06-30 RX ORDER — AMLODIPINE BESYLATE 5 MG/1
5 TABLET ORAL EVERY EVENING
Qty: 30 TABLET | Refills: 0 | Status: SHIPPED | OUTPATIENT
Start: 2023-06-30 | End: 2023-06-30

## 2023-06-30 RX ORDER — HYDROCHLOROTHIAZIDE 12.5 MG/1
12.5 TABLET ORAL DAILY
Qty: 30 TABLET | Refills: 0 | Status: SHIPPED | OUTPATIENT
Start: 2023-07-01 | End: 2023-06-30

## 2023-06-30 RX ORDER — METOCLOPRAMIDE HYDROCHLORIDE 5 MG/ML
10 INJECTION INTRAMUSCULAR; INTRAVENOUS ONCE
Status: COMPLETED | OUTPATIENT
Start: 2023-06-30 | End: 2023-06-30

## 2023-06-30 RX ORDER — HYDRALAZINE HYDROCHLORIDE 25 MG/1
25 TABLET, FILM COATED ORAL ONCE
Status: COMPLETED | OUTPATIENT
Start: 2023-06-30 | End: 2023-06-30

## 2023-06-30 RX ORDER — MAGNESIUM SULFATE HEPTAHYDRATE 40 MG/ML
2 INJECTION, SOLUTION INTRAVENOUS ONCE
Status: COMPLETED | OUTPATIENT
Start: 2023-06-30 | End: 2023-06-30

## 2023-06-30 RX ORDER — LOSARTAN POTASSIUM AND HYDROCHLOROTHIAZIDE 12.5; 5 MG/1; MG/1
1 TABLET ORAL DAILY
Qty: 30 TABLET | Refills: 0 | Status: SHIPPED | OUTPATIENT
Start: 2023-06-30 | End: 2023-07-30

## 2023-06-30 RX ORDER — LOSARTAN POTASSIUM 50 MG/1
50 TABLET ORAL
Status: DISCONTINUED | OUTPATIENT
Start: 2023-06-30 | End: 2023-06-30 | Stop reason: HOSPADM

## 2023-06-30 RX ORDER — BUTALBITAL, ACETAMINOPHEN AND CAFFEINE 50; 325; 40 MG/1; MG/1; MG/1
2 TABLET ORAL ONCE
Status: COMPLETED | OUTPATIENT
Start: 2023-06-30 | End: 2023-06-30

## 2023-06-30 RX ADMIN — ACETAMINOPHEN 650 MG: 325 TABLET ORAL at 05:09

## 2023-06-30 RX ADMIN — METOCLOPRAMIDE 10 MG: 5 INJECTION, SOLUTION INTRAMUSCULAR; INTRAVENOUS at 16:26

## 2023-06-30 RX ADMIN — HYDRALAZINE HYDROCHLORIDE 25 MG: 25 TABLET, FILM COATED ORAL at 09:19

## 2023-06-30 RX ADMIN — SENNOSIDES AND DOCUSATE SODIUM 2 TABLET: 50; 8.6 TABLET ORAL at 16:26

## 2023-06-30 RX ADMIN — ACETAMINOPHEN 650 MG: 325 TABLET ORAL at 14:16

## 2023-06-30 RX ADMIN — LABETALOL HYDROCHLORIDE 10 MG: 5 INJECTION INTRAVENOUS at 03:25

## 2023-06-30 RX ADMIN — ASPIRIN 325 MG: 325 TABLET ORAL at 05:06

## 2023-06-30 RX ADMIN — BUTALBITAL, ACETAMINOPHEN, AND CAFFEINE 2 TABLET: 50; 325; 40 TABLET ORAL at 16:26

## 2023-06-30 RX ADMIN — REGADENOSON 0.4 MG: 0.08 INJECTION, SOLUTION INTRAVENOUS at 10:32

## 2023-06-30 RX ADMIN — MAGNESIUM SULFATE HEPTAHYDRATE 2 G: 2 INJECTION, SOLUTION INTRAVENOUS at 16:29

## 2023-06-30 RX ADMIN — CLONIDINE HYDROCHLORIDE 0.1 MG: 0.1 TABLET ORAL at 14:16

## 2023-06-30 RX ADMIN — GUAIFENESIN 200 MG: 100 SOLUTION ORAL at 05:10

## 2023-06-30 RX ADMIN — LOSARTAN POTASSIUM 50 MG: 50 TABLET, FILM COATED ORAL at 13:05

## 2023-06-30 ASSESSMENT — FIBROSIS 4 INDEX: FIB4 SCORE: 1.07

## 2023-06-30 ASSESSMENT — PAIN DESCRIPTION - PAIN TYPE
TYPE: ACUTE PAIN

## 2023-06-30 NOTE — DISCHARGE SUMMARY
"Hospital Medicine Discharge Note     Admit Date:  6/29/2023       Discharge Date:   6/30/2023  LOS: 0 days     Primary Care Provider:    Philip BELLE M.D.    Attending Physician:  Kiera Rodríguez M.D.     Discharge Diagnoses:   Principal Problem:    Chest pain  Active Problems:    Hypertensive urgency    Leukopenia    Migraine        Hospital Summary (Brief Narrative):         Gely Pantoja is a 57 y.o. female who presented 6/29/2023 with chest pain.  Patient about a week ago began with a headache.  She treated the headache with migraine medications, this subsequently led to also cold-like symptoms that she took NyQuil.  The combination made her feel worse and when she went to urgent care they noticed this her blood pressure was systolically above 200 and so they started her on management with prednisone.  She was then sent home and since she was not getting better she went back to the [facility] emergency room where they evaluated her and her blood pressure was still high so she was started on metoprolol.  She has not developed chest pain and this is a pressure-like sensation 5 out of 10 intensity no nausea no vomiting no diaphoresis it does radiate into her left shoulder and down her left arm...\"      Suspect patient's chest pain is related to high blood pressure resulting in high LVDP and myocardial ischemia as a result with subsequent chest pain. Echo unremarkable. Trops neg. Stress test with apical ischemia that is likely a false positive with borderline abnormality. I ran it by reading cardiologist Dr. Padilla who agreed with good BP control as treatment. Patient was initiated on anti hypertensive losartan 50mg-hctz 12.5mg. This dose can be increased if SBP remains significantly elevated in outpatient setting. Metop was started at an urgent which is not first line anti hypertensive and I have discontinued it.      Disposition:   Discharge home    Condition:  Stable    Activity:   As tolerated "     Diet:   Heart Healthy Diet / Diabetic Diet    Discharge Medications:           Medication List        START taking these medications        Instructions   amLODIPine 5 MG Tabs  Commonly known as: Norvasc   Take 1 Tablet by mouth every evening for 30 days.  Dose: 5 mg     hydroCHLOROthiazide 12.5 MG tablet  Start taking on: July 1, 2023  Commonly known as: Hydrodiuril   Take 1 Tablet by mouth every day.  Dose: 12.5 mg            CHANGE how you take these medications        Instructions   methylPREDNISolone 4 MG Tbpk  What changed: additional instructions  Commonly known as: Medrol Dosepak   Follow schedule on package instructions.            CONTINUE taking these medications        Instructions   asa/apap/caffeine 250-250-65 MG Tabs  Commonly known as: Excedrin   Take 1 Tablet by mouth one time.  Dose: 1 Tablet     ibuprofen 200 MG Tabs  Commonly known as: Motrin   Take 200 mg by mouth one time. Indications: Migraine Headache  Dose: 200 mg     metoprolol SR 50 MG Tb24  Commonly known as: Toprol XL   Take 50 mg by mouth every day.  Dose: 50 mg     Rimegepant Sulfate 75 MG Tbdp  Commonly known as: Nurtec   Take 1 Tablet by mouth every day.  Dose: 75 mg     rizatriptan 10 MG tablet  Commonly known as: Maxalt   Take 10 mg by mouth one time as needed for Migraine.  Dose: 10 mg                Follow up appointment details :      I encouraged her to call his PCP to confirm follow up after discharge.    No future appointments.      Consultants:      None    Studies:    Imaging/ Testing:      EC-ECHOCARDIOGRAM COMPLETE W/O CONT   Final Result      NM-CARDIAC STRESS TEST         DX-CHEST-PORTABLE (1 VIEW)   Final Result      Upper normal heart size without edema identified          Procedures:        None      Instructions:      The were given instructions to return to the ER if patient's condition worsens      Time Spent on Discharge:     Discharge instructions were discussed with the patient at bedside.  Patient  expressed understanding and agreed to comply with all discharge instructions.    37 minutes were spent in the discharge planning and management of this  patient, including more than 50% of the time spent face to face in   Counseling.

## 2023-06-30 NOTE — PROGRESS NOTES
4 Eyes Skin Assessment Completed by ADARSH Teixeira and ADARSH [place].    Head WDL  Ears WDL  Nose WDL  Mouth WDL  Neck WDL  Breast/Chest WDL  Shoulder Blades WDL  Spine WDL  (R) Arm/Elbow/Hand WDL  (L) Arm/Elbow/Hand WDL  Abdomen WDL  Groin WDL  Scrotum/Coccyx/Buttocks WDL  (R) Leg WDL  (L) Leg WDL  (R) Heel/Foot/Toe WDL  (L) Heel/Foot/Toe Jaundice      Devices In Places Tele Box, ECG      Interventions In Place N/A    Possible Skin Injury No    Pictures Uploaded Into Epic N/A  Wound Consult Placed N/A  RN Wound Prevention Protocol Ordered No

## 2023-06-30 NOTE — CARE PLAN
The patient is Stable - Low risk of patient condition declining or worsening    Shift Goals  Clinical Goals: Stress test  Patient Goals: Discharge  Family Goals:    Progress made toward(s) clinical / shift goals:    Problem: Knowledge Deficit - Standard  Goal: Patient and family/care givers will demonstrate understanding of plan of care, disease process/condition, diagnostic tests and medications  Outcome: Progressing  Note: Patient's knowledge of plan of care, diagnostics, and medications regularly assessed. RN answers patient questions appropriately, education provided. Patient verbalizes better understanding of their condition.       Problem: Communication  Goal: The ability to communicate needs accurately and effectively will improve  Outcome: Progressing  Flowsheets (Taken 6/30/2023 4345)  Communication:   Assessed patient's ability to understand and communicate   Oriented patient to call light   Oriented family/support system to call light       Patient is not progressing towards the following goals:

## 2023-06-30 NOTE — H&P
Hospital Medicine History & Physical Note    Date of Service  6/29/2023    Primary Care Physician  Philip BELLE M.D.    Consultants  None    Specialist Names: None    Code Status  Full Code    Chief Complaint  Chief Complaint   Patient presents with    Chest Pressure    Blood Pressure Problem     58 yo female ambulates to triage with reports of elevated BP since Monday.  Patient was seen at  on Monday told to follow up with primary about BP and feels like it is not controlled.  Today reports she also developed some non radiating chest pressure.  Reports some SOB but has had a cold and headache which is what she was seen at the  for.         History of Presenting Illness  Gely Pantoja is a 57 y.o. female who presented 6/29/2023 with chest pain.  Patient about a week ago began with a headache.  She treated the headache with migraine medications, this subsequently led to also cold-like symptoms that she took NyQuil.  The combination made her feel worse and when she went to urgent care they noticed this her blood pressure was systolically above 200 and so they started her on management with prednisone.  She was then sent home and since she was not getting better she went back to the [facility] emergency room where they evaluated her and her blood pressure was still high so she was started on metoprolol.  She has not developed chest pain and this is a pressure-like sensation 5 out of 10 intensity no nausea no vomiting no diaphoresis it does radiate into her left shoulder and down her left arm.  Since the patient is having chest pain and uncontrolled hypertension we will admit her overnight to the telemetry [employer] unit.  Under telemetric monitoring she will be evaluated with serial cardiac markers followed by stress test in the morning.  In the meantime we will try to optimize medication management to stabilize her blood pressure.    I discussed the plan of care with patient, family, bedside RN, and  emergency room physician .    Review of Systems  Review of Systems   Constitutional:  Positive for malaise/fatigue. Negative for chills, diaphoresis and fever.   HENT: Negative.     Eyes: Negative.  Negative for double vision.   Respiratory: Negative.  Negative for cough, hemoptysis and wheezing.    Cardiovascular:  Positive for chest pain. Negative for palpitations and leg swelling.   Gastrointestinal: Negative.  Negative for abdominal pain, blood in stool, constipation, diarrhea, heartburn, nausea and vomiting.   Genitourinary: Negative.  Negative for frequency, hematuria and urgency.   Musculoskeletal: Negative.  Negative for joint pain.   Skin: Negative.  Negative for itching and rash.   Neurological:  Positive for headaches. Negative for dizziness, focal weakness, seizures and loss of consciousness.   Endo/Heme/Allergies: Negative.  Does not bruise/bleed easily.   Psychiatric/Behavioral:  Negative for depression, substance abuse and suicidal ideas. The patient is nervous/anxious.    All other systems reviewed and are negative.      Past Medical History   has no past medical history on file.    Surgical History   has no past surgical history on file.     Family History  family history is not on file.   Family history reviewed with patient. There is no family history that is pertinent to the chief complaint.     Social History   reports that she has never smoked. She has never used smokeless tobacco. She reports current alcohol use. She reports that she does not use drugs.    Allergies  No Known Allergies    Medications  Prior to Admission Medications   Prescriptions Last Dose Informant Patient Reported? Taking?   Rimegepant Sulfate 75 MG TABLET DISPERSIBLE 6/29/2023 at 0900 Patient No No   Sig: Take 1 Tablet by mouth every day.   asa/apap/caffeine (EXCEDRIN) 250-250-65 MG Tab 6/29/2023 at 1100 Patient Yes Yes   Sig: Take 1 Tablet by mouth one time.   ibuprofen (MOTRIN) 200 MG Tab 6/29/2023 at 1100 Patient Yes  Yes   Sig: Take 200 mg by mouth one time. Indications: Migraine Headache   methylPREDNISolone (MEDROL DOSEPAK) 4 MG Tablet Therapy Pack 6/29/2023 at 0900 Patient No No   Sig: Follow schedule on package instructions.   Patient taking differently: Follow schedule on package instructions.  Pt on 3rd day of 3 tablets a day   metoprolol SR (TOPROL XL) 50 MG TABLET SR 24 HR 6/29/2023 at 0900 Patient Yes No   Sig: Take 50 mg by mouth every day.   rizatriptan (MAXALT) 10 MG tablet 6/22/2023 at unk Patient Yes No   Sig: Take 10 mg by mouth one time as needed for Migraine.      Facility-Administered Medications: None       Physical Exam  Temp:  [36.8 °C (98.3 °F)] 36.8 °C (98.3 °F)  Pulse:  [65-72] 65  Resp:  [18-22] 19  BP: (149-193)/() 161/98  SpO2:  [95 %-96 %] 95 %  Blood Pressure: (!) 161/98   Temperature: 36.8 °C (98.3 °F)   Pulse: 65   Respiration: 19   Pulse Oximetry: 95 %       Physical Exam  Vitals and nursing note reviewed. Exam conducted with a chaperone present.   Constitutional:       General: She is awake.      Appearance: Normal appearance. She is well-developed, well-groomed and normal weight. She is ill-appearing.   HENT:      Head: Normocephalic and atraumatic.      Jaw: There is normal jaw occlusion. No trismus.      Salivary Glands: Right salivary gland is not tender. Left salivary gland is not tender.      Right Ear: External ear normal.      Left Ear: External ear normal.      Nose: Nose normal.      Mouth/Throat:      Mouth: Mucous membranes are moist.      Pharynx: Oropharynx is clear.   Eyes:      General: Lids are normal. Vision grossly intact.      Extraocular Movements: Extraocular movements intact.      Conjunctiva/sclera: Conjunctivae normal.      Right eye: Right conjunctiva is not injected. No exudate.     Left eye: Left conjunctiva is not injected. No exudate.     Pupils: Pupils are equal, round, and reactive to light.   Neck:      Thyroid: No thyroid mass.      Vascular: No  hepatojugular reflux or JVD.      Trachea: No abnormal tracheal secretions or tracheal deviation.   Cardiovascular:      Rate and Rhythm: Normal rate and regular rhythm. Occasional Extrasystoles are present.     Pulses: Normal pulses.      Heart sounds: Normal heart sounds. No murmur heard.     No friction rub.   Pulmonary:      Effort: Pulmonary effort is normal.      Breath sounds: Normal breath sounds. No wheezing or rhonchi.   Abdominal:      General: Abdomen is flat. Bowel sounds are normal.      Palpations: Abdomen is soft.      Tenderness: There is no abdominal tenderness. There is no right CVA tenderness or left CVA tenderness.      Hernia: No hernia is present.   Musculoskeletal:         General: Normal range of motion.      Cervical back: Full passive range of motion without pain, normal range of motion and neck supple. No rigidity. No muscular tenderness.      Right lower leg: No edema.      Left lower leg: No edema.   Lymphadenopathy:      Head:      Right side of head: No submental adenopathy.      Left side of head: No submental adenopathy.      Cervical:      Right cervical: No superficial cervical adenopathy.     Left cervical: No superficial cervical adenopathy.      Upper Body:      Right upper body: No supraclavicular adenopathy.      Left upper body: No supraclavicular adenopathy.   Skin:     General: Skin is warm and dry.      Capillary Refill: Capillary refill takes less than 2 seconds.      Coloration: Skin is not cyanotic or pale.      Findings: No abrasion or bruising.   Neurological:      General: No focal deficit present.      Mental Status: She is alert and oriented to person, place, and time. Mental status is at baseline.      GCS: GCS eye subscore is 4. GCS verbal subscore is 5. GCS motor subscore is 6.      Cranial Nerves: No cranial nerve deficit.      Sensory: No sensory deficit.      Motor: Motor function is intact.      Deep Tendon Reflexes:      Reflex Scores:       Tricep  reflexes are 2+ on the right side and 2+ on the left side.       Bicep reflexes are 2+ on the right side and 2+ on the left side.       Brachioradialis reflexes are 2+ on the right side and 2+ on the left side.       Patellar reflexes are 2+ on the right side and 2+ on the left side.       Achilles reflexes are 2+ on the right side and 2+ on the left side.  Psychiatric:         Attention and Perception: Attention and perception normal.         Mood and Affect: Mood normal.         Speech: Speech normal.         Behavior: Behavior normal. Behavior is cooperative.         Thought Content: Thought content normal.         Cognition and Memory: Cognition and memory normal.         Judgment: Judgment normal.         Laboratory:  Recent Labs     06/29/23  1628   WBC 3.3*   RBC 4.42   HEMOGLOBIN 13.2   HEMATOCRIT 39.8   MCV 90.0   MCH 29.9   MCHC 33.2   RDW 45.7   PLATELETCT 193   MPV 9.8     Recent Labs     06/29/23  1628   SODIUM 139   POTASSIUM 3.8   CHLORIDE 104   CO2 21   GLUCOSE 100*   BUN 18   CREATININE 0.61   CALCIUM 9.2     Recent Labs     06/29/23  1628   ALTSGPT 14   ASTSGOT 14   ALKPHOSPHAT 76   TBILIRUBIN 0.2   GLUCOSE 100*         Recent Labs     06/29/23  1628   NTPROBNP 119         Recent Labs     06/29/23  1628   TROPONINT 7       Imaging:  DX-CHEST-PORTABLE (1 VIEW)   Final Result      Upper normal heart size without edema identified      NM-CARDIAC STRESS TEST    (Results Pending)       X-Ray:  I have personally reviewed the images and compared with prior images.  EKG:  I have personally reviewed the images and compared with prior images.    Assessment/Plan:  Justification for Admission Status  I anticipate this patient is appropriate for observation status at this time because patient is having chest pain and this will require evaluation but can be accomplished in less than 23 hours    Patient will need a Telemetry bed on MEDICAL service .  The need is secondary to chest pain.    * Chest pain- (present  on admission)  Assessment & Plan  The ASCVD Risk score (Laurence JUAREZ, et al., 2019) failed to calculate for the following reasons:    Cannot find a previous HDL lab    Cannot find a previous total cholesterol lab   Patient about a week ago developed headaches for which she took migraine medication at after this she developed cold-like symptoms for which she took NyQuil.  Her symptoms became worse and so she went to see her physician who was not available and thus she was referred to urgent care.  In urgent care they noticed that her blood pressure was high and so they treated her for possible COVID induced headache with a combination of prednisone and antibiotics.  The patient still did not get better so she went back to the emergency room in [facility] where they evaluated her and found her blood pressure to be high so they started her metoprolol and sent her home.  The patient continued to have however negative effects and notes today came to the emergency room because she was now having also chest pain.  Her blood pressure was once again elevated to over 200 systolic.  In the emergency room patient received labetalol and her blood pressure now is down to 161/98.  Patient's chest pressure at this point is improved there is no associated nausea vomiting or shortness of breath or diaphoresis with it.  She still has mild headache with it.  The patient this point will be evaluated serial cardiac markers followed by stress test in the morning.  In the meantime I will also optimize blood pressure management        Hypertensive urgency  Assessment & Plan  Uncontrolled blood pressure ever since she took migraine medications with NyQuil and was also started on prednisone.  At this point stop the NyQuil and the prednisone and diminish her migraine medication management.  Continue with metoprolol add Norvasc, hydrochlorothiazide and as needed clonidine if blood pressure remains uncontrolled.    Migraine  Assessment &  Plan  History of migraines and at this point the patient has been taking ibuprofen, Nurtec, Maxalt, and Excedrin Migraine medication.  Headache seems to be better controlled however these may be in a combination elevated her blood pressure as well.    Leukopenia  Assessment & Plan  Mild leukopenia at 3.3 this may be a prednisone reaction        VTE prophylaxis: SCDs/TEDs

## 2023-06-30 NOTE — ASSESSMENT & PLAN NOTE
The ASCVD Risk score (Laurence JUAREZ, et al., 2019) failed to calculate for the following reasons:    Cannot find a previous HDL lab    Cannot find a previous total cholesterol lab   Patient about a week ago developed headaches for which she took migraine medication at after this she developed cold-like symptoms for which she took NyQuil.  Her symptoms became worse and so she went to see her physician who was not available and thus she was referred to urgent care.  In urgent care they noticed that her blood pressure was high and so they treated her for possible COVID induced headache with a combination of prednisone and antibiotics.  The patient still did not get better so she went back to the emergency room in [facility] where they evaluated her and found her blood pressure to be high so they started her metoprolol and sent her home.  The patient continued to have however negative effects and notes today came to the emergency room because she was now having also chest pain.  Her blood pressure was once again elevated to over 200 systolic.  In the emergency room patient received labetalol and her blood pressure now is down to 161/98.  Patient's chest pressure at this point is improved there is no associated nausea vomiting or shortness of breath or diaphoresis with it.  She still has mild headache with it.  The patient this point will be evaluated serial cardiac markers followed by stress test in the morning.  In the meantime I will also optimize blood pressure management

## 2023-06-30 NOTE — ASSESSMENT & PLAN NOTE
Uncontrolled blood pressure ever since she took migraine medications with NyQuil and was also started on prednisone.  At this point stop the NyQuil and the prednisone and diminish her migraine medication management.  Continue with metoprolol add Norvasc, hydrochlorothiazide and as needed clonidine if blood pressure remains uncontrolled.

## 2023-06-30 NOTE — PROGRESS NOTES
Telemetry Shift Summary    Rhythm SR  HR Range 60s-80s  Ectopy rare PVC/PAC  Measurements 0.14/0.08/0.40        Normal Values  Rhythm SR  HR Range    Measurements 0.12-0.20 / 0.06-0.10  / 0.30-0.52

## 2023-06-30 NOTE — PROGRESS NOTES
Patient presents to NM suite for cardiac stress test with MPI. Nursing goals identified: knowledge deficit, potential for anxiety r/t stress test, potential for compromised cardiac output. Care plan includes educating patient, reassurance and access to ACLS cart/team. Labs and ECG reviewed. No caffeine and NPO confirmed. Resting images attained and patient prepped for pharmacological stress study. Lexiscan given while patient ambulated on TM x 2 mins. Patient reported these symptoms: R eye pressure, headache 4/10, lightheaded. Caffeinated beverage provided. Symptoms resolved.

## 2023-06-30 NOTE — PROGRESS NOTES
2030: Patient arrived to [facility]. All belongings with patient.  at bedside. Admit profile and skin check complete.

## 2023-06-30 NOTE — CARE PLAN
The patient is Stable - Low risk of patient condition declining or worsening    Shift Goals  Clinical Goals: VSS, no chest pain, stress test 6/30, NPO at midnight  Patient Goals: Rest  Family Goals: Get better and come home    Progress made toward(s) clinical / shift goals:     Problem: Fluid Volume  Goal: Fluid volume balance will be maintained  Outcome: Progressing  Note: Diuretics in place per MD order. PRN BP medications provided for the hypertensive patient.     Problem: Infection - Standard  Goal: Patient will remain free from infection  Outcome: Progressing  Flowsheets (Taken 6/30/2023 0420)  Standard Infection Interventions: Implemented standard precautions  Note: Appropriate hand hygiene performed upon entrance and exit of room.

## 2023-06-30 NOTE — ASSESSMENT & PLAN NOTE
History of migraines and at this point the patient has been taking ibuprofen, Nurtec, Maxalt, and Excedrin Migraine medication.  Headache seems to be better controlled however these may be in a combination elevated her blood pressure as well.

## 2023-07-01 NOTE — PROGRESS NOTES
Patient with marked alleviation of migraine following fioricet/reglan/magnesium administration. IV site/telemetry removed. Discharge instructions reviewed with patient. Patient off unit via ambulation with .

## 2023-07-05 ENCOUNTER — TELEPHONE (OUTPATIENT)
Dept: HEALTH INFORMATION MANAGEMENT | Facility: OTHER | Age: 58
End: 2023-07-05

## 2023-08-18 ENCOUNTER — APPOINTMENT (OUTPATIENT)
Dept: RADIOLOGY | Facility: MEDICAL CENTER | Age: 58
End: 2023-08-18
Attending: EMERGENCY MEDICINE
Payer: COMMERCIAL

## 2023-08-18 ENCOUNTER — HOSPITAL ENCOUNTER (OUTPATIENT)
Facility: MEDICAL CENTER | Age: 58
End: 2023-08-19
Attending: EMERGENCY MEDICINE | Admitting: INTERNAL MEDICINE
Payer: COMMERCIAL

## 2023-08-18 DIAGNOSIS — I10 HYPERTENSION, UNSPECIFIED TYPE: ICD-10-CM

## 2023-08-18 DIAGNOSIS — G45.9 TIA (TRANSIENT ISCHEMIC ATTACK): ICD-10-CM

## 2023-08-18 DIAGNOSIS — R29.90 STROKE-LIKE SYMPTOMS: ICD-10-CM

## 2023-08-18 LAB
BASOPHILS # BLD AUTO: 0.5 % (ref 0–1.8)
BASOPHILS # BLD: 0.03 K/UL (ref 0–0.12)
EOSINOPHIL # BLD AUTO: 0.16 K/UL (ref 0–0.51)
EOSINOPHIL NFR BLD: 2.9 % (ref 0–6.9)
ERYTHROCYTE [DISTWIDTH] IN BLOOD BY AUTOMATED COUNT: 49.5 FL (ref 35.9–50)
GLUCOSE BLD STRIP.AUTO-MCNC: 94 MG/DL (ref 65–99)
HCT VFR BLD AUTO: 35.5 % (ref 37–47)
HGB BLD-MCNC: 11.6 G/DL (ref 12–16)
IMM GRANULOCYTES # BLD AUTO: 0.02 K/UL (ref 0–0.11)
IMM GRANULOCYTES NFR BLD AUTO: 0.4 % (ref 0–0.9)
LYMPHOCYTES # BLD AUTO: 1.58 K/UL (ref 1–4.8)
LYMPHOCYTES NFR BLD: 28.3 % (ref 22–41)
MCH RBC QN AUTO: 30.2 PG (ref 27–33)
MCHC RBC AUTO-ENTMCNC: 32.7 G/DL (ref 32.2–35.5)
MCV RBC AUTO: 92.4 FL (ref 81.4–97.8)
MONOCYTES # BLD AUTO: 0.47 K/UL (ref 0–0.85)
MONOCYTES NFR BLD AUTO: 8.4 % (ref 0–13.4)
NEUTROPHILS # BLD AUTO: 3.32 K/UL (ref 1.82–7.42)
NEUTROPHILS NFR BLD: 59.5 % (ref 44–72)
NRBC # BLD AUTO: 0 K/UL
NRBC BLD-RTO: 0 /100 WBC (ref 0–0.2)
PLATELET # BLD AUTO: 235 K/UL (ref 164–446)
PMV BLD AUTO: 10.1 FL (ref 9–12.9)
RBC # BLD AUTO: 3.84 M/UL (ref 4.2–5.4)
WBC # BLD AUTO: 5.6 K/UL (ref 4.8–10.8)

## 2023-08-18 PROCEDURE — 82962 GLUCOSE BLOOD TEST: CPT

## 2023-08-18 PROCEDURE — 99285 EMERGENCY DEPT VISIT HI MDM: CPT

## 2023-08-18 PROCEDURE — 85025 COMPLETE CBC W/AUTO DIFF WBC: CPT

## 2023-08-18 PROCEDURE — 70496 CT ANGIOGRAPHY HEAD: CPT

## 2023-08-18 PROCEDURE — 80053 COMPREHEN METABOLIC PANEL: CPT

## 2023-08-18 PROCEDURE — 70498 CT ANGIOGRAPHY NECK: CPT

## 2023-08-18 PROCEDURE — 84484 ASSAY OF TROPONIN QUANT: CPT

## 2023-08-18 PROCEDURE — 36415 COLL VENOUS BLD VENIPUNCTURE: CPT

## 2023-08-18 PROCEDURE — 700117 HCHG RX CONTRAST REV CODE 255: Performed by: EMERGENCY MEDICINE

## 2023-08-18 PROCEDURE — 71045 X-RAY EXAM CHEST 1 VIEW: CPT

## 2023-08-18 PROCEDURE — 70450 CT HEAD/BRAIN W/O DYE: CPT

## 2023-08-18 PROCEDURE — 93005 ELECTROCARDIOGRAM TRACING: CPT | Performed by: EMERGENCY MEDICINE

## 2023-08-18 PROCEDURE — 83036 HEMOGLOBIN GLYCOSYLATED A1C: CPT

## 2023-08-18 RX ADMIN — IOHEXOL 60 ML: 350 INJECTION, SOLUTION INTRAVENOUS at 23:18

## 2023-08-18 ASSESSMENT — FIBROSIS 4 INDEX: FIB4 SCORE: 1.09

## 2023-08-19 ENCOUNTER — APPOINTMENT (OUTPATIENT)
Dept: CARDIOLOGY | Facility: MEDICAL CENTER | Age: 58
End: 2023-08-19
Attending: FAMILY MEDICINE
Payer: COMMERCIAL

## 2023-08-19 ENCOUNTER — APPOINTMENT (OUTPATIENT)
Dept: RADIOLOGY | Facility: MEDICAL CENTER | Age: 58
End: 2023-08-19
Attending: FAMILY MEDICINE
Payer: COMMERCIAL

## 2023-08-19 VITALS
OXYGEN SATURATION: 94 % | HEART RATE: 69 BPM | HEIGHT: 66 IN | DIASTOLIC BLOOD PRESSURE: 86 MMHG | WEIGHT: 236.55 LBS | RESPIRATION RATE: 19 BRPM | TEMPERATURE: 98 F | BODY MASS INDEX: 38.02 KG/M2 | SYSTOLIC BLOOD PRESSURE: 129 MMHG

## 2023-08-19 PROBLEM — D64.9 NORMOCYTIC ANEMIA: Status: ACTIVE | Noted: 2023-08-19

## 2023-08-19 PROBLEM — R29.90 STROKE-LIKE SYMPTOMS: Status: ACTIVE | Noted: 2023-08-19

## 2023-08-19 PROBLEM — I10 ESSENTIAL HYPERTENSION: Status: ACTIVE | Noted: 2023-08-19

## 2023-08-19 LAB
ALBUMIN SERPL BCP-MCNC: 4.5 G/DL (ref 3.2–4.9)
ALBUMIN/GLOB SERPL: 1.7 G/DL
ALP SERPL-CCNC: 93 U/L (ref 30–99)
ALT SERPL-CCNC: 19 U/L (ref 2–50)
ANION GAP SERPL CALC-SCNC: 13 MMOL/L (ref 7–16)
AST SERPL-CCNC: 17 U/L (ref 12–45)
BILIRUB SERPL-MCNC: <0.2 MG/DL (ref 0.1–1.5)
BUN SERPL-MCNC: 28 MG/DL (ref 8–22)
CALCIUM ALBUM COR SERPL-MCNC: 9.1 MG/DL (ref 8.5–10.5)
CALCIUM SERPL-MCNC: 9.5 MG/DL (ref 8.4–10.2)
CHLORIDE SERPL-SCNC: 102 MMOL/L (ref 96–112)
CHOLEST SERPL-MCNC: 173 MG/DL (ref 100–199)
CO2 SERPL-SCNC: 23 MMOL/L (ref 20–33)
CREAT SERPL-MCNC: 0.99 MG/DL (ref 0.5–1.4)
EKG IMPRESSION: NORMAL
EST. AVERAGE GLUCOSE BLD GHB EST-MCNC: 117 MG/DL
GFR SERPLBLD CREATININE-BSD FMLA CKD-EPI: 67 ML/MIN/1.73 M 2
GLOBULIN SER CALC-MCNC: 2.6 G/DL (ref 1.9–3.5)
GLUCOSE SERPL-MCNC: 94 MG/DL (ref 65–99)
HBA1C MFR BLD: 5.7 % (ref 4–5.6)
HDLC SERPL-MCNC: 47 MG/DL
LDLC SERPL CALC-MCNC: 83 MG/DL
LV EJECT FRACT  99904: 60
LV EJECT FRACT MOD 2C 99903: 59.65
LV EJECT FRACT MOD 4C 99902: 55.41
LV EJECT FRACT MOD BP 99901: 58
POTASSIUM SERPL-SCNC: 3.6 MMOL/L (ref 3.6–5.5)
PROT SERPL-MCNC: 7.1 G/DL (ref 6–8.2)
SODIUM SERPL-SCNC: 138 MMOL/L (ref 135–145)
TRIGL SERPL-MCNC: 215 MG/DL (ref 0–149)
TROPONIN T SERPL-MCNC: 7 NG/L (ref 6–19)

## 2023-08-19 PROCEDURE — 80061 LIPID PANEL: CPT

## 2023-08-19 PROCEDURE — 94760 N-INVAS EAR/PLS OXIMETRY 1: CPT

## 2023-08-19 PROCEDURE — 97165 OT EVAL LOW COMPLEX 30 MIN: CPT

## 2023-08-19 PROCEDURE — 700102 HCHG RX REV CODE 250 W/ 637 OVERRIDE(OP): Performed by: INTERNAL MEDICINE

## 2023-08-19 PROCEDURE — 93306 TTE W/DOPPLER COMPLETE: CPT | Mod: 26 | Performed by: INTERNAL MEDICINE

## 2023-08-19 PROCEDURE — 36415 COLL VENOUS BLD VENIPUNCTURE: CPT

## 2023-08-19 PROCEDURE — G0378 HOSPITAL OBSERVATION PER HR: HCPCS

## 2023-08-19 PROCEDURE — 99236 HOSP IP/OBS SAME DATE HI 85: CPT | Performed by: INTERNAL MEDICINE

## 2023-08-19 PROCEDURE — A9270 NON-COVERED ITEM OR SERVICE: HCPCS | Performed by: INTERNAL MEDICINE

## 2023-08-19 PROCEDURE — 93306 TTE W/DOPPLER COMPLETE: CPT

## 2023-08-19 PROCEDURE — 70551 MRI BRAIN STEM W/O DYE: CPT

## 2023-08-19 RX ORDER — HYDROCHLOROTHIAZIDE 12.5 MG/1
12.5 TABLET ORAL
Status: DISCONTINUED | OUTPATIENT
Start: 2023-08-19 | End: 2023-08-19 | Stop reason: HOSPADM

## 2023-08-19 RX ORDER — ATORVASTATIN CALCIUM 40 MG/1
40 TABLET, FILM COATED ORAL EVERY EVENING
Status: DISCONTINUED | OUTPATIENT
Start: 2023-08-19 | End: 2023-08-19 | Stop reason: HOSPADM

## 2023-08-19 RX ORDER — ACETAMINOPHEN 325 MG/1
650 TABLET ORAL EVERY 6 HOURS PRN
Status: DISCONTINUED | OUTPATIENT
Start: 2023-08-19 | End: 2023-08-19 | Stop reason: HOSPADM

## 2023-08-19 RX ORDER — PROMETHAZINE HYDROCHLORIDE 25 MG/1
12.5-25 TABLET ORAL EVERY 4 HOURS PRN
Status: DISCONTINUED | OUTPATIENT
Start: 2023-08-19 | End: 2023-08-19 | Stop reason: HOSPADM

## 2023-08-19 RX ORDER — BISACODYL 10 MG
10 SUPPOSITORY, RECTAL RECTAL
Status: DISCONTINUED | OUTPATIENT
Start: 2023-08-19 | End: 2023-08-19 | Stop reason: HOSPADM

## 2023-08-19 RX ORDER — POLYETHYLENE GLYCOL 3350 17 G/17G
1 POWDER, FOR SOLUTION ORAL
Status: DISCONTINUED | OUTPATIENT
Start: 2023-08-19 | End: 2023-08-19 | Stop reason: HOSPADM

## 2023-08-19 RX ORDER — AMOXICILLIN 250 MG
2 CAPSULE ORAL 2 TIMES DAILY
Status: DISCONTINUED | OUTPATIENT
Start: 2023-08-19 | End: 2023-08-19 | Stop reason: HOSPADM

## 2023-08-19 RX ORDER — ONDANSETRON 4 MG/1
4 TABLET, ORALLY DISINTEGRATING ORAL EVERY 4 HOURS PRN
Status: DISCONTINUED | OUTPATIENT
Start: 2023-08-19 | End: 2023-08-19 | Stop reason: HOSPADM

## 2023-08-19 RX ORDER — ONDANSETRON 2 MG/ML
4 INJECTION INTRAMUSCULAR; INTRAVENOUS EVERY 4 HOURS PRN
Status: DISCONTINUED | OUTPATIENT
Start: 2023-08-19 | End: 2023-08-19 | Stop reason: HOSPADM

## 2023-08-19 RX ORDER — ASPIRIN 81 MG/1
81 TABLET, CHEWABLE ORAL DAILY
Status: DISCONTINUED | OUTPATIENT
Start: 2023-08-19 | End: 2023-08-19 | Stop reason: HOSPADM

## 2023-08-19 RX ORDER — ASPIRIN 300 MG/1
300 SUPPOSITORY RECTAL DAILY
Status: DISCONTINUED | OUTPATIENT
Start: 2023-08-19 | End: 2023-08-19 | Stop reason: HOSPADM

## 2023-08-19 RX ORDER — PROCHLORPERAZINE EDISYLATE 5 MG/ML
5-10 INJECTION INTRAMUSCULAR; INTRAVENOUS EVERY 4 HOURS PRN
Status: DISCONTINUED | OUTPATIENT
Start: 2023-08-19 | End: 2023-08-19 | Stop reason: HOSPADM

## 2023-08-19 RX ORDER — LISINOPRIL 10 MG/1
10 TABLET ORAL DAILY
Qty: 30 TABLET | Refills: 0
Start: 2023-08-20

## 2023-08-19 RX ORDER — LISINOPRIL 5 MG/1
10 TABLET ORAL
Status: DISCONTINUED | OUTPATIENT
Start: 2023-08-19 | End: 2023-08-19 | Stop reason: HOSPADM

## 2023-08-19 RX ORDER — HYDROCHLOROTHIAZIDE 12.5 MG/1
12.5 TABLET ORAL DAILY
Qty: 30 TABLET | Refills: 0
Start: 2023-08-20

## 2023-08-19 RX ORDER — ATORVASTATIN CALCIUM 40 MG/1
40 TABLET, FILM COATED ORAL EVERY EVENING
Qty: 30 TABLET | Refills: 0 | Status: SHIPPED | OUTPATIENT
Start: 2023-08-19

## 2023-08-19 RX ORDER — LABETALOL HYDROCHLORIDE 5 MG/ML
10 INJECTION, SOLUTION INTRAVENOUS EVERY 4 HOURS PRN
Status: DISCONTINUED | OUTPATIENT
Start: 2023-08-19 | End: 2023-08-19 | Stop reason: HOSPADM

## 2023-08-19 RX ORDER — PROMETHAZINE HYDROCHLORIDE 25 MG/1
12.5-25 SUPPOSITORY RECTAL EVERY 4 HOURS PRN
Status: DISCONTINUED | OUTPATIENT
Start: 2023-08-19 | End: 2023-08-19 | Stop reason: HOSPADM

## 2023-08-19 RX ORDER — AMLODIPINE BESYLATE 5 MG/1
5 TABLET ORAL
Status: DISCONTINUED | OUTPATIENT
Start: 2023-08-19 | End: 2023-08-19 | Stop reason: HOSPADM

## 2023-08-19 RX ORDER — ASPIRIN 81 MG/1
81 TABLET ORAL DAILY
Qty: 100 TABLET | Refills: 0 | Status: SHIPPED | OUTPATIENT
Start: 2023-08-19

## 2023-08-19 RX ORDER — AMLODIPINE BESYLATE 10 MG/1
10 TABLET ORAL DAILY
Qty: 30 TABLET | Refills: 0 | Status: SHIPPED | OUTPATIENT
Start: 2023-08-20

## 2023-08-19 RX ADMIN — LISINOPRIL 10 MG: 5 TABLET ORAL at 05:26

## 2023-08-19 RX ADMIN — AMLODIPINE BESYLATE 5 MG: 5 TABLET ORAL at 05:26

## 2023-08-19 RX ADMIN — HYDROCHLOROTHIAZIDE 12.5 MG: 12.5 TABLET ORAL at 05:26

## 2023-08-19 RX ADMIN — ASPIRIN 81 MG 81 MG: 81 TABLET ORAL at 05:26

## 2023-08-19 RX ADMIN — SENNOSIDES AND DOCUSATE SODIUM 2 TABLET: 50; 8.6 TABLET ORAL at 05:26

## 2023-08-19 ASSESSMENT — ENCOUNTER SYMPTOMS
SPUTUM PRODUCTION: 0
HEADACHES: 0
VOMITING: 0
LOSS OF CONSCIOUSNESS: 0
ABDOMINAL PAIN: 0
DEPRESSION: 0
SHORTNESS OF BREATH: 0
DIARRHEA: 0
COUGH: 0
FOCAL WEAKNESS: 1
FEVER: 0
DIZZINESS: 0
PALPITATIONS: 0
CONSTIPATION: 0
SPEECH CHANGE: 1
STRIDOR: 0
TINGLING: 0
NAUSEA: 0
FALLS: 0
MYALGIAS: 0
CHILLS: 0

## 2023-08-19 ASSESSMENT — PATIENT HEALTH QUESTIONNAIRE - PHQ9
2. FEELING DOWN, DEPRESSED, IRRITABLE, OR HOPELESS: NOT AT ALL
1. LITTLE INTEREST OR PLEASURE IN DOING THINGS: NOT AT ALL
1. LITTLE INTEREST OR PLEASURE IN DOING THINGS: NOT AT ALL
2. FEELING DOWN, DEPRESSED, IRRITABLE, OR HOPELESS: NOT AT ALL
SUM OF ALL RESPONSES TO PHQ9 QUESTIONS 1 AND 2: 0
SUM OF ALL RESPONSES TO PHQ9 QUESTIONS 1 AND 2: 0

## 2023-08-19 ASSESSMENT — COGNITIVE AND FUNCTIONAL STATUS - GENERAL
MOBILITY SCORE: 24
SUGGESTED CMS G CODE MODIFIER MOBILITY: CH
DAILY ACTIVITIY SCORE: 24
SUGGESTED CMS G CODE MODIFIER DAILY ACTIVITY: CH
DAILY ACTIVITIY SCORE: 24
SUGGESTED CMS G CODE MODIFIER DAILY ACTIVITY: CH

## 2023-08-19 ASSESSMENT — PAIN DESCRIPTION - PAIN TYPE
TYPE: ACUTE PAIN

## 2023-08-19 ASSESSMENT — FIBROSIS 4 INDEX: FIB4 SCORE: 0.93

## 2023-08-19 ASSESSMENT — LIFESTYLE VARIABLES
ALCOHOL_USE: YES
HOW MANY TIMES IN THE PAST YEAR HAVE YOU HAD 5 OR MORE DRINKS IN A DAY: 3
AVERAGE NUMBER OF DAYS PER WEEK YOU HAVE A DRINK CONTAINING ALCOHOL: 1
CONSUMPTION TOTAL: POSITIVE
TOTAL SCORE: 0
EVER HAD A DRINK FIRST THING IN THE MORNING TO STEADY YOUR NERVES TO GET RID OF A HANGOVER: NO
EVER FELT BAD OR GUILTY ABOUT YOUR DRINKING: NO
TOTAL SCORE: 0
HAVE PEOPLE ANNOYED YOU BY CRITICIZING YOUR DRINKING: NO
ON A TYPICAL DAY WHEN YOU DRINK ALCOHOL HOW MANY DRINKS DO YOU HAVE: 3
TOTAL SCORE: 0
HAVE YOU EVER FELT YOU SHOULD CUT DOWN ON YOUR DRINKING: NO

## 2023-08-19 ASSESSMENT — ACTIVITIES OF DAILY LIVING (ADL): TOILETING: INDEPENDENT

## 2023-08-19 NOTE — DISCHARGE SUMMARY
Discharge Summary    CHIEF COMPLAINT ON ADMISSION  Chief Complaint   Patient presents with    Slurred Speech     1 episode slurred speech at 2230     Unilateral Weakness     Pt notes left arm weakness @ 2230. Symptoms have resolved at this time. PT also notes difficulty ambulating.        Reason for Admission  Generalized Weakness; Blood Pressu*     Admission Date  8/18/2023    CODE STATUS  Full Code    HPI & HOSPITAL COURSE  Pt is a 59yo female with a history of back pain and obesity and recent diagnosis of hypertension who presented to hospital with slurred speech, difficulty ambulating and unilateral arm weakness. She had driven that day back from [place] to [place], and symptoms started at 10:30 at night. Symptoms had resolved within one minute, so in the ER, lytics were not given.      Of note, she takes antihypertensives at home but still had a BP of 176/82 upon presentation to the ER, which has trended down without intervention. She relates that she takes her meds at night so she had not yet taken her daily dose, but that her Bps at home are also not yet optimized as she tends to run in the 130s systolic. CT/CTA head and neck were negative, as was MRI. She has history of migraines so a complicated migraine is on the differential, but unlikely given her incredibly short duration of symptoms.  Pt may have had a TIA was hypertensive emergency as she states that her BP was in the 180s systolic prior to presenting to hospital. I will increase her Norvasc to optimize Bps as an outpatient, and she will be started on a baby aspirin and set up with a Zio patch and Stroke Bridge referral.  I advised her to avoid the Excedrin that she was taking and to discuss an alternative to her PRN Maxalt with her PCP for her migraines given the small thromboembolic association with Maxalt. She is stable for discharge home.       Therefore, she is discharged in good and stable condition to home with close outpatient follow-up.    The  patient recovered much more quickly than anticipated on admission.    Discharge Date  8/19/23    FOLLOW UP ITEMS POST DISCHARGE  PCP in one week for BP check, stroke bridge referral    DISCHARGE DIAGNOSES  Principal Problem:    Stroke-like symptoms (POA: Yes)  Active Problems:    Essential hypertension (POA: Yes)    Normocytic anemia (POA: Yes)  Resolved Problems:    * No resolved hospital problems. *      FOLLOW UP  No future appointments.  Philip BELLE M.D.  [address]  [facility] [address] [phone]    Call  Please see your primary care physician in one week for a blood pressure check.      MEDICATIONS ON DISCHARGE     Medication List        START taking these medications        Instructions   amLODIPine 10 MG Tabs  Start taking on: August 20, 2023  Commonly known as: Norvasc   Take 1 Tablet by mouth every day.  Dose: 10 mg     aspirin 81 MG EC tablet   Take 1 Tablet by mouth every day.  Dose: 81 mg     atorvastatin 40 MG Tabs  Commonly known as: Lipitor   Take 1 Tablet by mouth every evening.  Dose: 40 mg     hydroCHLOROthiazide 12.5 MG tablet  Start taking on: August 20, 2023  Commonly known as: Hydrodiuril   Take 1 Tablet by mouth every day.  Dose: 12.5 mg     lisinopril 10 MG Tabs  Start taking on: August 20, 2023  Commonly known as: Prinivil   Take 1 Tablet by mouth every day.  Dose: 10 mg            CONTINUE taking these medications        Instructions   Rimegepant Sulfate 75 MG Tbdp  Commonly known as: Nurtec   Take 1 Tablet by mouth every day.  Dose: 75 mg            STOP taking these medications      asa/apap/caffeine 250-250-65 MG Tabs  Commonly known as: Excedrin     ibuprofen 200 MG Tabs  Commonly known as: Motrin     rizatriptan 10 MG tablet  Commonly known as: Maxalt              Allergies  No Known Allergies    DIET  Orders Placed This Encounter   Procedures    Diet Order Diet: Regular     Standing Status:   Standing     Number of Occurrences:   1     Order Specific Question:    Diet:     Answer:   Regular [1]       ACTIVITY  As tolerated.  Weight bearing as tolerated    CONSULTATIONS  None    PROCEDURES  None    LABORATORY  Lab Results   Component Value Date    SODIUM 138 08/18/2023    POTASSIUM 3.6 08/18/2023    CHLORIDE 102 08/18/2023    CO2 23 08/18/2023    GLUCOSE 94 08/18/2023    BUN 28 (H) 08/18/2023    CREATININE 0.99 08/18/2023        Lab Results   Component Value Date    WBC 5.6 08/18/2023    HEMOGLOBIN 11.6 (L) 08/18/2023    HEMATOCRIT 35.5 (L) 08/18/2023    PLATELETCT 235 08/18/2023        Total time of the discharge process exceeds 36 minutes.

## 2023-08-19 NOTE — ED PROVIDER NOTES
ED Provider Note    CHIEF COMPLAINT  Chief Complaint   Patient presents with    Slurred Speech     1 episode slurred speech at 2230     Unilateral Weakness     Pt notes left arm weakness @ 2230. Symptoms have resolved at this time. PT also notes difficulty ambulating.        EXTERNAL RECORDS REVIEWED  Inpatient Notes discharge summary from 6/30/2023 reviewed; primary problem of chest pain in addition to hypertensive urgency, leukopenia and migraine noted    HPI/ROS  LIMITATION TO HISTORY   Select: : None  OUTSIDE HISTORIAN(S):  Family  at bedside    Gely Pantoja is a 58 y.o. female who presents to the emergency department for episode of slurred speech and unilateral weakness.  Past medical history as documented below.  Patient does note that she was seen in the emergency department relatively recently for elevated blood pressure.  Today she drove from [place] back here to [place].  States that the trip overall was fine and low stress.  Similarly today's drive is also low stress and relatively easy.  After relaxing at home and reading a book she had sudden onset of symptoms as described above to include difficulty with speech and left-sided weakness around 1030.  She was able to get her 's attention which then became concerned about possible strokelike symptoms and brought the patient here to the ER.    Currently the patient is feeling well and states that the overall episode lasted only a few minutes at most.  No prior history of the same.  No blood thinners.    PAST MEDICAL HISTORY   has a past medical history of Back pain.    SURGICAL HISTORY   has a past surgical history that includes fusion, spine, lumbar, plif and cervical disk and fusion anterior.    FAMILY HISTORY  No family history on file.    SOCIAL HISTORY  Social History     Tobacco Use    Smoking status: Never    Smokeless tobacco: Never   Vaping Use    Vaping Use: Never used   Substance and Sexual Activity    Alcohol use: Not Currently  "    Comment: 1 glass of wine per month    Drug use: Never    Sexual activity: Not on file       CURRENT MEDICATIONS  Home Medications       Reviewed by Shabbir Mcmullen R.N. (Registered Nurse) on 08/18/23 at 2304  Med List Status: Not Addressed     Medication Last Dose Status   asa/apap/caffeine (EXCEDRIN) 250-250-65 MG Tab  Active   ibuprofen (MOTRIN) 200 MG Tab  Active   Rimegepant Sulfate 75 MG TABLET DISPERSIBLE  Active   rizatriptan (MAXALT) 10 MG tablet  Active                    ALLERGIES  No Known Allergies    PHYSICAL EXAM  VITAL SIGNS: /79   Pulse 69   Temp 36.8 °C (98.2 °F) (Temporal)   Resp 17   Ht 1.676 m (5' 6\")   Wt 105 kg (230 lb 13.2 oz)   SpO2 96%   BMI 37.26 kg/m²      Pulse ox interpretation: I interpret this pulse ox as normal.  Constitutional: Alert in no apparent distress.  HENT: No signs of trauma, Bilateral external ears normal, Nose normal.   Eyes: Pupils are equal and reactive  Neck: Normal range of motion, No tenderness, Supple  Cardiovascular: Regular rate and rhythm, no murmurs.   Thorax & Lungs: Normal breath sounds, No respiratory distress, No wheezing, No chest tenderness.   Abdomen: Bowel sounds normal, Soft, No tenderness  Skin: Warm, Dry, No erythema, No rash.   Extremities: Intact distal pulses  Musculoskeletal: Good range of motion in all major joints. No tenderness to palpation or major deformities noted.   Neurologic: Alert , Normal motor function, Normal sensory function, No focal deficits noted.  NIH is 0  Psychiatric: Affect normal, Judgment normal, Mood normal.         DIAGNOSTIC STUDIES / PROCEDURES      LABS  Results for orders placed or performed during the hospital encounter of 08/18/23   CBC WITH DIFFERENTIAL   Result Value Ref Range    WBC 5.6 4.8 - 10.8 K/uL    RBC 3.84 (L) 4.20 - 5.40 M/uL    Hemoglobin 11.6 (L) 12.0 - 16.0 g/dL    Hematocrit 35.5 (L) 37.0 - 47.0 %    MCV 92.4 81.4 - 97.8 fL    MCH 30.2 27.0 - 33.0 pg    MCHC 32.7 32.2 - 35.5 g/dL "    RDW 49.5 35.9 - 50.0 fL    Platelet Count 235 164 - 446 K/uL    MPV 10.1 9.0 - 12.9 fL    Neutrophils-Polys 59.50 44.00 - 72.00 %    Lymphocytes 28.30 22.00 - 41.00 %    Monocytes 8.40 0.00 - 13.40 %    Eosinophils 2.90 0.00 - 6.90 %    Basophils 0.50 0.00 - 1.80 %    Immature Granulocytes 0.40 0.00 - 0.90 %    Nucleated RBC 0.00 0.00 - 0.20 /100 WBC    Neutrophils (Absolute) 3.32 1.82 - 7.42 K/uL    Lymphs (Absolute) 1.58 1.00 - 4.80 K/uL    Monos (Absolute) 0.47 0.00 - 0.85 K/uL    Eos (Absolute) 0.16 0.00 - 0.51 K/uL    Baso (Absolute) 0.03 0.00 - 0.12 K/uL    Immature Granulocytes (abs) 0.02 0.00 - 0.11 K/uL    NRBC (Absolute) 0.00 K/uL   COMP METABOLIC PANEL   Result Value Ref Range    Sodium 138 135 - 145 mmol/L    Potassium 3.6 3.6 - 5.5 mmol/L    Chloride 102 96 - 112 mmol/L    Co2 23 20 - 33 mmol/L    Anion Gap 13.0 7.0 - 16.0    Glucose 94 65 - 99 mg/dL    Bun 28 (H) 8 - 22 mg/dL    Creatinine 0.99 0.50 - 1.40 mg/dL    Calcium 9.5 8.4 - 10.2 mg/dL    Correct Calcium 9.1 8.5 - 10.5 mg/dL    AST(SGOT) 17 12 - 45 U/L    ALT(SGPT) 19 2 - 50 U/L    Alkaline Phosphatase 93 30 - 99 U/L    Total Bilirubin <0.2 0.1 - 1.5 mg/dL    Albumin 4.5 3.2 - 4.9 g/dL    Total Protein 7.1 6.0 - 8.2 g/dL    Globulin 2.6 1.9 - 3.5 g/dL    A-G Ratio 1.7 g/dL   TROPONIN   Result Value Ref Range    Troponin T 7 6 - 19 ng/L   ESTIMATED GFR   Result Value Ref Range    GFR (CKD-EPI) 67 >60 mL/min/1.73 m 2   EKG (NOW)   Result Value Ref Range    Report       [facility]    Test Date:  2023  Pt Name:    CESAR PATTERSON              Department: EDS  MRN:        [id number]                      Room:       -ROOM 5  Gender:     Female                       Technician: [id number]  :        [id number]                   Requested By:STEPHEN GHOTRA  Order #:    [id number]                    Reading MD: Stephen Ghotra    Measurements  Intervals                                Axis  Rate:       69                            P:          12  TN:         166                          QRS:        -9  QRSD:       97                           T:          18  QT:         394  QTc:        422    Interpretive Statements  Sinus rhythm  Compared to ECG 06/29/2023 15:38:44  No significant changes  Electronically Signed On 08- 00:05:12 PDT by Stephen Rowe     POCT glucose device results   Result Value Ref Range    POC Glucose, Blood 94 65 - 99 mg/dL     EKG is interpreted by myself as above    RADIOLOGY  I have independently interpreted the diagnostic imaging associated with this visit and am waiting the final reading from the radiologist.   My preliminary interpretation is as follows: No intracranial hemorrhage  Radiologist interpretation:   DX-CHEST-PORTABLE (1 VIEW)   Final Result      1.  There is no acute cardiopulmonary process.      CT-CTA NECK WITH & W/O-POST PROCESSING   Final Result      1.  No significant stenosis or occlusion of the internal carotid arteries.      CT-CTA HEAD WITH & W/O-POST PROCESS   Final Result      1.  CT angiogram of the [place] demonstrating no large vessel occlusion.      CT-HEAD W/O   Final Result      1.  Head CT without contrast within normal limits. No evidence of acute cerebral infarction, hemorrhage or mass lesion.               COURSE & MEDICAL DECISION MAKING    ED Observation Status? No; Patient does not meet criteria for ED Observation.     INITIAL ASSESSMENT, COURSE AND PLAN  Care Narrative: 58-year-old female presented emerged department with TIA-like symptomatology.  Recent work-up and treatment for hypertension.  Will complete stroke work-up at this time.    Patient remains with NIH of 0  DISPOSITION AND DISCUSSIONS  I have discussed management of the patient with the following physicians and OTTO's: Hospitalist    Discussion of management with other Q or appropriate source(s): Pharmacy for medication verification        Decision tools and prescription drugs  considered including, but not limited to: NIH Stroke Scale 0 .    58-year-old female presented emerged part with above presentation.  She has remained asymptomatic throughout her ER course.  Blood pressure is down trended and more appropriate with systolic pressures in the 120s on reevaluations.  At this point I do not believe that the patient is a lytic candidate.  I will have her brought into the hospital service for ongoing neuro monitoring and further stroke work-up likely to include MRI of the brain.    FINAL DIAGNOSIS  1. TIA (transient ischemic attack)    2. Hypertension, unspecified type           Electronically signed by: Stephen Rowe M.D., 8/18/2023 11:20 PM

## 2023-08-19 NOTE — PROGRESS NOTES
Telemetry Shift Summary     Rhythm: SR  HR: 64-67  Ectopy: rPAC  Measurements: 0.18/0.08/0.40       Normal Values  Rhythm: SR  HR:   Measurements: 0.12-0.20 / 0.04-0.10 / 0.30-0.52

## 2023-08-19 NOTE — PROGRESS NOTES
4 Eyes Skin Assessment Completed by Brendan, ADARSH and ADARSH Mott.    Head WDL  Ears WDL  Nose WDL  Mouth WDL  Neck WDL  Breast/Chest WDL  Shoulder Blades WDL  Spine Incision healed from previous infsions  (R) Arm/Elbow/Hand WDL  (L) Arm/Elbow/Hand WDL  Abdomen WDL  Groin WDL  Scrotum/Coccyx/Buttocks WDL  (R) Leg WDL  (L) Leg WDL  (R) Heel/Foot/Toe WDL  (L) Heel/Foot/Toe WDL          Devices In Places Pulse Ox      Interventions In Place Pressure Redistribution Mattress    Possible Skin Injury No    Pictures Uploaded Into Epic N/A  Wound Consult Placed N/A  RN Wound Prevention Protocol Ordered No

## 2023-08-19 NOTE — ED TRIAGE NOTES
"Chief Complaint   Patient presents with    Slurred Speech     1 episode slurred speech at 2230     Unilateral Weakness     Pt notes left arm weakness @ 2230. Symptoms have resolved at this time. PT also notes difficulty ambulating.      BP (!) 176/82   Pulse 79   Temp 36.8 °C (98.2 °F) (Temporal)   Resp 18   Ht 1.676 m (5' 6\")   Wt 105 kg (230 lb 13.2 oz)   SpO2 97%   BMI 37.26 kg/m²     "

## 2023-08-19 NOTE — ASSESSMENT & PLAN NOTE
Patient did certainly have concerning symptoms with speech change, ataxia and left hand weakness, all now resolved  Start aspirin statin  Imaging thus far has shown nothing acute  Obtain MRI of the brain  Monitor patient on telemetry

## 2023-08-19 NOTE — THERAPY
Occupational Therapy   Initial Evaluation     Patient Name: Gely Pantoja  Age:  58 y.o., Sex:  female  Medical Record #: [id number]  Today's Date: 8/19/2023     Assessment  Patient is 58 y.o. female admitted with stroke-like symptoms which have since resolved. Pt is A&Ox4, pleasant and cooperative. Vision- intact. BUE strength,ROM,FMC,sensation - WFL. Shows good sit/stand static/dynamic balance for ADL's. Indep with functional mobility and ADL's. Pt works, lives with spouse and 22 y/o dtr. No further OT needs.         Plan  OT Eval only. Pt has no acute PT needs.      DC Equipment Recommendations: (P) None  Discharge Recommendations: (P) Anticipate that the patient will have no further occupational therapy needs after discharge from the hospital     Subjective  Agreeable     Objective   08/19/23 0852   Prior Living Situation   Prior Services None   Housing / Facility 1 Story House   Steps Into Home 0   Steps In Home 0   Bathroom Set up Walk In Shower   Equipment Owned None   Lives with - Patient's Self Care Capacity Spouse;Adult Children   Prior Level of ADL Function   Self Feeding Independent   Grooming / Hygiene Independent   Bathing Independent   Dressing Independent   Toileting Independent   Prior Level of IADL Function   Medication Management Independent   Laundry Independent   Kitchen Mobility Independent   Finances Independent   Home Management Independent   Shopping Independent   Prior Level Of Mobility Independent Without Device in Home   Driving / Transportation Driving Independent   Occupation (Pre-Hospital Vocational) Employed Full Time   Leisure Interests Hobbies   History of Falls   History of Falls No   Cognition    Cognition / Consciousness WDL   Level of Consciousness Alert   Comments Ox4   Passive ROM Upper Body   Passive ROM Upper Body WDL   Active ROM Upper Body   Active ROM Upper Body  WDL   Dominant Hand Right   Strength Upper Body   Upper Body Strength  WDL   Sensation Upper Body   Upper  Extremity Sensation  WDL   Upper Body Muscle Tone   Upper Body Muscle Tone  WDL   Neurological Concerns   Neurological Concerns No   Coordination Upper Body   Coordination WDL   Balance Assessment   Sitting Balance (Static) Good   Sitting Balance (Dynamic) Good   Standing Balance (Static) Good   Standing Balance (Dynamic) Good   Weight Shift Sitting Good   Weight Shift Standing Good   Bed Mobility    Supine to Sit Independent   Sit to Supine Independent   Scooting Independent   Rolling Independent   ADL Assessment   Eating Independent   Grooming Independent   Upper Body Dressing Independent   Lower Body Dressing Independent   Toileting Independent   How much help from another person does the patient currently need...   Putting on and taking off regular lower body clothing? 4   Bathing (including washing, rinsing, and drying)? 4   Toileting, which includes using a toilet, bedpan, or urinal? 4   Putting on and taking off regular upper body clothing? 4   Taking care of personal grooming such as brushing teeth? 4   Eating meals? 4   6 Clicks Daily Activity Score 24   mRS Prior to admission   Prior to admission mRS 0   Modified [place] (mRS)   Modified [place] Score 0   Functional Mobility   Sit to Stand Independent   Bed, Chair, Wheelchair Transfer Independent   Toilet Transfers Independent   Transfer Method Stand Step   Mobility no AD   Comments steady   Visual Perception   Visual Perception  WDL   Activity Tolerance   Sitting in Chair functional   Sitting Edge of Bed functional   Standing functional   Education Group   Role of Occupational Therapist Patient Response Patient;Acceptance;Explanation;Verbal Demonstration   Anticipated Discharge Equipment and Recommendations   DC Equipment Recommendations None   Discharge Recommendations Anticipate that the patient will have no further occupational therapy needs after discharge from the hospital   Interdisciplinary Plan of Care Collaboration   IDT Collaboration with  Nursing    Patient Position at End of Therapy In Bed   Collaboration Comments 1x only OT Eval. Pt has no PT needs.

## 2023-08-19 NOTE — CARE PLAN
The patient is Stable - Low risk of patient condition declining or worsening    Shift Goals  Clinical Goals: monitor patient for changes from baseline neuro checks  Patient Goals: rest    Progress made toward(s) clinical / shift goals:  patient has voided, patient's neuro status hasn't changed from the ED's evaluation.     Patient is not progressing towards the following goals: n/a

## 2023-08-19 NOTE — ASSESSMENT & PLAN NOTE
Patient does follow with her primary care provider, months ago was placed on metoprolol however then she had a recent hospital stay due to hypertensive urgency, at that point in time, her blood pressure meds were adjusted  Patient has been compliant with her medications but has been unable to check her blood pressure as often as she supposed to due to recent travel  She did check her blood pressure right after the event, noted the systolic blood pressure to be greater than 180  At this point in time, symptoms have resolved, I do not feel permissive hypertension is needed  Continue home amlodipine, lisinopril and hydrochlorothiazide  Start PRN labetalol  Adjust as needed

## 2023-08-19 NOTE — PROGRESS NOTES
0125 - Pt arrived on floor from ER, A&Ox4, on RA, reviewed orders, and medication requisition and admission profile done. Oriented Pt to the floor. Plan of care discussed with Pt and answered questions asked. Safety precautions in place and hourly rounding established with CNA.     0128 - NIH scale as well as assessment completed    0325 - patient is sleeping. Visible chest rise and fall with breathing.    0529 - Medicated per MAR, MRI screening completed

## 2023-08-19 NOTE — H&P
Hospital Medicine History & Physical Note    Date of Service  8/19/2023    Primary Care Physician  Philip BELLE M.D.    Consultants  None    Specialist Names: None    Code Status  Full Code    Chief Complaint  Chief Complaint   Patient presents with    Slurred Speech     1 episode slurred speech at 2230     Unilateral Weakness     Pt notes left arm weakness @ 2230. Symptoms have resolved at this time. PT also notes difficulty ambulating.        History of Presenting Illness  Gely Pantoja is a 58 y.o. female who presented 8/18/2023 with difficulty ambulating, left hand weakness and speech change.  Patient just arrived back from a trip to [place], was at home and was asymptomatic until earlier this evening.  She states she was lying on her right side reading a book when she noted that her left hand became very weak, she was unable to control it like she normally could.  She states she also felt hot, because of that she was getting up and turn on the fan.  When she tried to get up she noted some difficulty ambulating.  She states it felt like she was at [facility] and she could not walk normally.  Because of this, she went to discuss this with her , when she tried to speak she was not able to say exactly what she meant and he felt she was slurring her words.  At that time, she sat down, quickly improved.  Because of the symptoms, patient was brought to the emergency department.  I did discuss the case include labs and imaging with ER physician.    I discussed the plan of care with patient and family.    Review of Systems  Review of Systems   Constitutional:  Negative for chills, fever and malaise/fatigue.   HENT:  Negative for congestion.    Respiratory:  Negative for cough, sputum production, shortness of breath and stridor.    Cardiovascular:  Negative for chest pain, palpitations and leg swelling.   Gastrointestinal:  Negative for abdominal pain, constipation, diarrhea, nausea and vomiting.    Genitourinary:  Negative for dysuria and urgency.   Musculoskeletal:  Negative for falls and myalgias.   Neurological:  Positive for speech change and focal weakness. Negative for dizziness, tingling, loss of consciousness and headaches.   Psychiatric/Behavioral:  Negative for depression and suicidal ideas.    All other systems reviewed and are negative.      Past Medical History   has a past medical history of Back pain.    Surgical History   has a past surgical history that includes fusion, spine, lumbar, plif and cervical disk and fusion anterior.     Family History  family history is not on file.   Family history reviewed with patient. There is no family history that is pertinent to the chief complaint.     Social History   reports that she has never smoked. She has never used smokeless tobacco. She reports that she does not currently use alcohol. She reports that she does not use drugs.    Allergies  No Known Allergies    Medications  Prior to Admission Medications   Prescriptions Last Dose Informant Patient Reported? Taking?   Rimegepant Sulfate 75 MG TABLET DISPERSIBLE  Patient No No   Sig: Take 1 Tablet by mouth every day.   asa/apap/caffeine (EXCEDRIN) 250-250-65 MG Tab  Patient Yes No   Sig: Take 1 Tablet by mouth one time.   ibuprofen (MOTRIN) 200 MG Tab  Patient Yes No   Sig: Take 200 mg by mouth one time. Indications: Migraine Headache   rizatriptan (MAXALT) 10 MG tablet  Patient Yes No   Sig: Take 10 mg by mouth one time as needed for Migraine.      Facility-Administered Medications: None       Physical Exam  Temp:  [36.8 °C (98.2 °F)] 36.8 °C (98.2 °F)  Pulse:  [69-79] 69  Resp:  [14-18] 17  BP: (126-176)/(79-87) 126/79  SpO2:  [96 %-97 %] 96 %  Blood Pressure: 126/79   Temperature: 36.8 °C (98.2 °F)   Pulse: 69   Respiration: 17   Pulse Oximetry: 96 %       Physical Exam  Vitals and nursing note reviewed.   Constitutional:       General: She is not in acute distress.     Appearance: She is  well-developed. She is not diaphoretic.   HENT:      Head: Normocephalic and atraumatic.      Right Ear: External ear normal.      Left Ear: External ear normal.      Nose: Nose normal. No congestion or rhinorrhea.      Mouth/Throat:      Pharynx: No oropharyngeal exudate.   Eyes:      General:         Right eye: No discharge.         Left eye: No discharge.   Neck:      Trachea: No tracheal deviation.   Cardiovascular:      Rate and Rhythm: Normal rate and regular rhythm.      Heart sounds: No murmur heard.     No friction rub. No gallop.   Pulmonary:      Effort: Pulmonary effort is normal. No respiratory distress.      Breath sounds: Normal breath sounds. No stridor. No wheezing or rales.   Chest:      Chest wall: No tenderness.   Abdominal:      General: Bowel sounds are normal. There is no distension.      Palpations: Abdomen is soft.      Tenderness: There is no abdominal tenderness.   Musculoskeletal:         General: No tenderness. Normal range of motion.      Cervical back: Neck supple.      Right lower leg: No edema.      Left lower leg: No edema.   Lymphadenopathy:      Cervical: No cervical adenopathy.   Skin:     General: Skin is warm and dry.      Findings: No erythema or rash.   Neurological:      Mental Status: She is alert and oriented to person, place, and time.      Cranial Nerves: No cranial nerve deficit.      Sensory: No sensory deficit.      Motor: No weakness.      Coordination: Coordination normal.   Psychiatric:         Mood and Affect: Mood normal.         Behavior: Behavior normal.         Thought Content: Thought content normal.         Judgment: Judgment normal.         Laboratory:  Recent Labs     08/18/23  2303   WBC 5.6   RBC 3.84*   HEMOGLOBIN 11.6*   HEMATOCRIT 35.5*   MCV 92.4   MCH 30.2   MCHC 32.7   RDW 49.5   PLATELETCT 235   MPV 10.1     Recent Labs     08/18/23  2303   SODIUM 138   POTASSIUM 3.6   CHLORIDE 102   CO2 23   GLUCOSE 94   BUN 28*   CREATININE 0.99   CALCIUM 9.5  "    Recent Labs     08/18/23  2303   ALTSGPT 19   ASTSGOT 17   ALKPHOSPHAT 93   TBILIRUBIN <0.2   GLUCOSE 94         No results for input(s): \"NTPROBNP\" in the last 72 hours.      Recent Labs     08/18/23  2303   TROPONINT 7       Imaging:  DX-CHEST-PORTABLE (1 VIEW)   Final Result      1.  There is no acute cardiopulmonary process.      CT-CTA NECK WITH & W/O-POST PROCESSING   Final Result      1.  No significant stenosis or occlusion of the internal carotid arteries.      CT-CTA HEAD WITH & W/O-POST PROCESS   Final Result      1.  CT angiogram of the [place] demonstrating no large vessel occlusion.      CT-HEAD W/O   Final Result      1.  Head CT without contrast within normal limits. No evidence of acute cerebral infarction, hemorrhage or mass lesion.         MR-BRAIN-W/O    (Results Pending)       EKG:  I have personally reviewed the images and compared with prior images.    Assessment/Plan:  Justification for Admission Status  I anticipate this patient is appropriate for observation status at this time because stroke rule out    Patient will need a Telemetry bed on MEDICAL service .  The need is secondary to strokelike symptoms.    * Stroke-like symptoms- (present on admission)  Assessment & Plan  Patient did certainly have concerning symptoms with speech change, ataxia and left hand weakness, all now resolved  Start aspirin statin  Imaging thus far has shown nothing acute  Obtain MRI of the brain  Monitor patient on telemetry    Essential hypertension- (present on admission)  Assessment & Plan  Patient does follow with her primary care provider, months ago was placed on metoprolol however then she had a recent hospital stay due to hypertensive urgency, at that point in time, her blood pressure meds were adjusted  Patient has been compliant with her medications but has been unable to check her blood pressure as often as she supposed to due to recent travel  She did check her blood pressure right after " the event, noted the systolic blood pressure to be greater than 180  At this point in time, symptoms have resolved, I do not feel permissive hypertension is needed  Continue home amlodipine, lisinopril and hydrochlorothiazide  Start PRN labetalol  Adjust as needed    Normocytic anemia- (present on admission)  Assessment & Plan  No sign of gross bleeding        VTE prophylaxis: SCDs/TEDs

## 2023-08-19 NOTE — DISCHARGE INSTRUCTIONS
Diet: Diet Order Diet: DASH  Activity: As tolerated  Follow Up: Please see Dr Thapa in one week for a blood pressure check. Please check your blood pressure twice daily while at home - please keep a log of these blood pressures in a log book to present to your primary care provider.  If your blood pressure is less than 100/50, or greater than 180/100, please seek medical attention. Please do not take excedrin for your headaches, it will worsen your blood pressure. The stroke bridge clinic will call you with an appointment.  Disposition:Home  Diagnosis: Stroke-like symptoms    Follow up with your Primary Care Provider Philip BELLE M.D. as scheduled or sooner if your symptoms persist or worsen.  Return to Emergency Room for severe chest pain, shortness of breath, signs of a stroke, or any other emergencies.

## 2023-08-19 NOTE — PROGRESS NOTES
0700 introduced myself to pt    0832 pt just finished breakfast    1100 checked on pt. No needs at this time    1239 pt with family member having lunch

## 2023-08-19 NOTE — PROGRESS NOTES
Received report from night shift RN. Assumed pt care 0700  Pt is A&Ox4, resting comfortably in bed. Plan of care reviewed for activities and goals this shift. Medication eduction provided.  Pt verbalizes understanding of plan of care for this shift. Pt on room air; no signs of SOB/respiratory distress. Labs noted. Pt denies pain and nausea at this moment.  Patients needs attended well. Fall precautions in place. Bed at lowest position. Call light and personal belongings within reach.   Hourly rounding in progress.      RN assisted pt to MRI - requested to stay by tech.   After exam pt brought back to room, Echo tech called for exam.   Plan of care reviewed with pt and family.   1415- Zio patch monitor placement and education provided at bedside with pt and family   Echo report pending review.   Discharge instructions reviewed. Medication education, follow up and home care education provided. Pt verbalizes understanding of instructions.   Level of comfort increased prior to discharge. VSS. PIV removed. Belongings gathered to take home.

## 2023-08-25 ENCOUNTER — TELEPHONE (OUTPATIENT)
Dept: HEALTH INFORMATION MANAGEMENT | Facility: OTHER | Age: 58
End: 2023-08-25
Payer: COMMERCIAL

## 2023-08-31 ENCOUNTER — TELEPHONE (OUTPATIENT)
Dept: CARDIOLOGY | Facility: MEDICAL CENTER | Age: 58
End: 2023-08-31
Payer: COMMERCIAL

## 2023-08-31 DIAGNOSIS — G45.9 TIA (TRANSIENT ISCHEMIC ATTACK): ICD-10-CM

## 2023-09-08 ENCOUNTER — NON-PROVIDER VISIT (OUTPATIENT)
Dept: CARDIOLOGY | Facility: MEDICAL CENTER | Age: 58
End: 2023-09-08
Payer: COMMERCIAL

## 2023-09-08 ENCOUNTER — OFFICE VISIT (OUTPATIENT)
Dept: NEUROLOGY | Facility: MEDICAL CENTER | Age: 58
End: 2023-09-08
Attending: PSYCHIATRY & NEUROLOGY
Payer: COMMERCIAL

## 2023-09-08 VITALS
BODY MASS INDEX: 37.27 KG/M2 | TEMPERATURE: 98.1 F | DIASTOLIC BLOOD PRESSURE: 74 MMHG | SYSTOLIC BLOOD PRESSURE: 122 MMHG | HEART RATE: 79 BPM | HEIGHT: 66 IN | WEIGHT: 231.92 LBS | OXYGEN SATURATION: 100 %

## 2023-09-08 DIAGNOSIS — I47.10 SVT (SUPRAVENTRICULAR TACHYCARDIA) (HCC): ICD-10-CM

## 2023-09-08 DIAGNOSIS — I49.1 PREMATURE ATRIAL CONTRACTIONS: ICD-10-CM

## 2023-09-08 DIAGNOSIS — I49.3 PVCS (PREMATURE VENTRICULAR CONTRACTIONS): ICD-10-CM

## 2023-09-08 DIAGNOSIS — G45.9 TRANSIENT ISCHEMIC ATTACK (TIA): Primary | ICD-10-CM

## 2023-09-08 PROBLEM — R29.90 STROKE-LIKE SYMPTOMS: Status: RESOLVED | Noted: 2023-08-19 | Resolved: 2023-09-08

## 2023-09-08 PROCEDURE — 99204 OFFICE O/P NEW MOD 45 MIN: CPT | Performed by: PSYCHIATRY & NEUROLOGY

## 2023-09-08 PROCEDURE — 99202 OFFICE O/P NEW SF 15 MIN: CPT | Performed by: PSYCHIATRY & NEUROLOGY

## 2023-09-08 PROCEDURE — 3078F DIAST BP <80 MM HG: CPT | Performed by: PSYCHIATRY & NEUROLOGY

## 2023-09-08 PROCEDURE — 3074F SYST BP LT 130 MM HG: CPT | Performed by: PSYCHIATRY & NEUROLOGY

## 2023-09-08 ASSESSMENT — FIBROSIS 4 INDEX: FIB4 SCORE: 0.93

## 2023-09-08 NOTE — PROGRESS NOTES
Chief Complaint   Patient presents with    Follow-Up     Stroke Bridge        History of present illness:  Gely Pantoja 58 y.o. female with transient slurred speech, difficulty ambulating and unilateral arm weakness, with symptoms lasting less than 1 minute.  Her blood pressure was 176/82 on presentation to the emergency department, but her symptoms had resolved on arrival.    She has been diagnosed with hypertension since July 2023. She was recently back home from [place] when she was reading - her left hand was flopping around out of control. She tried to stand up to walk but was not smoothly walking, she was leaning to her left. She was noted to be slurring her words by her . The entire duration of the event was 2 minutes. The  did not notice any asymmetry of her face but her speech was slurred. She sat down to check her blood pressure and it was in the 180's systolic.    There was no vertigo or dizziness. Since then, there has been no repeated similar events.     Past medical history:   Past Medical History:   Diagnosis Date    Back pain        Past surgical history:   Past Surgical History:   Procedure Laterality Date    CERVICAL DISK AND FUSION ANTERIOR      FUSION, SPINE, LUMBAR, PLIF         Family history:   No family history on file.    Social history:   Social History     Socioeconomic History    Marital status:      Spouse name: Not on file    Number of children: Not on file    Years of education: Not on file    Highest education level: Not on file   Occupational History    Not on file   Tobacco Use    Smoking status: Never    Smokeless tobacco: Never   Vaping Use    Vaping Use: Never used   Substance and Sexual Activity    Alcohol use: Not Currently     Comment: 1 glass of wine per month    Drug use: Never    Sexual activity: Not on file   Other Topics Concern    Not on file   Social History Narrative    Not on file     Social Determinants of Health     Financial Resource  "Strain: Not on file   Food Insecurity: Not on file   Transportation Needs: Not on file   Physical Activity: Not on file   Stress: Not on file   Social Connections: Not on file   Intimate Partner Violence: Not on file   Housing Stability: Not on file       Current medications:   Current Outpatient Medications   Medication    aspirin 81 MG EC tablet    amLODIPine (NORVASC) 10 MG Tab    atorvastatin (LIPITOR) 40 MG Tab    lisinopril (PRINIVIL) 10 MG Tab    hydroCHLOROthiazide (HYDRODIURIL) 12.5 MG tablet    Rimegepant Sulfate 75 MG TABLET DISPERSIBLE     No current facility-administered medications for this visit.       Medication Allergy:  No Known Allergies    Physical examination:   Vitals:    23 1237   BP: 122/74   BP Location: Left arm   Patient Position: Sitting   BP Cuff Size: Large adult   Pulse: 79   Temp: 36.7 °C (98.1 °F)   TempSrc: Temporal   SpO2: 100%   Weight: 105 kg (231 lb 14.8 oz)   Height: 1.676 m (5' 5.98\")     Neurological Exam  Mental Status  Awake and alert. Speech is normal. Language is fluent with no aphasia.    Cranial Nerves  CN II: Right normal visual field. Left normal visual field.  CN III, IV, VI: Extraocular movements intact bilaterally. No nystagmus. Normal smooth pursuit.  CN VII:  Right: There is no facial weakness.  Left: There is no facial weakness.    Motor   No pronator drift.    Sensory  Light touch is normal in upper and lower extremities.     Coordination  Right: Finger-to-nose normal. Heel-to-shin normal.Left: Finger-to-nose normal. Heel-to-shin normal.    Gait  Casual gait is normal including stance, stride, and arm swing. Normal tandem gait.      Labs:  I reviewed the following labs personally:  Lab Results   Component Value Date/Time    HBA1C 5.7 (H) 2023 11:03 PM      Lab Results   Component Value Date/Time    CHOLSTRLTOT 173 2023 1134    TRIGLYCERIDE 215 (H) 2023 1134    HDL 47 2023 1134    LDL 83 2023 1134     Imagin23 CTA " NECK   IMPRESSION:     1.  No significant stenosis or occlusion of the internal carotid arteries.    8/18/23 CTA HEAD   FINDINGS:  The posterior circulation shows the distal vertebral arteries to be patent. The vertebrobasilar confluence is intact. The basilar artery is patent. No aneurysm or occlusive lesion is evident. 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     The anterior circulation shows no stenotic or occlusive lesion. No aneurysm is evident about the [place]. There are some artifact in cavernous portions of the ICA but they are grossly patent.     The brain demonstrates no abnormal enhancement.     Dural venous sinuses are patent.     3D angiographic/MIP images of the vasculature confirm the vascular findings as described above.     IMPRESSION:     1.  CT angiogram of the [place] demonstrating no large vessel occlusion.    TRANSTHORACIC ECHO   CONCLUSIONS  Normal chamber sizes. Normal LV and RV systolic function. No   significant valvular disease. Bubble study negative fo R to L shunt.    ASSESSMENT AND PLAN:  Problem List Items Addressed This Visit       Transient ischemic attack (TIA) - Primary    Relevant Orders    Cardiac Event Monitor    REFERRAL TO CARDIOLOGY       1. Transient ischemic attack (TIA)  - Cardiac Event Monitor; Future  - REFERRAL TO CARDIOLOGY    I have counseled the patient on the diagnosis of a TIA secondary to 2 minutes of SLURRED SPEECH, LEFT ARM WEAKNESS AND GAIT IMBALANCE.     There was normal vascular imaging and transthoracic echo.     Etiology of TIA: small vessel disease (hypertension).     Your goal LDL cholesterol is < 70   Recheck cholesterol 2 months after the TIA, approximately 10/19/23    Continue aspirin 81mg daily. This is a indefinite therapy   Continue atorvastatin 40mg daily.     Your goal blood pressure is < 140/90 for long-term control.     I have ordered a Ziopatch monitor for an additional 14 days - first  study was only 1 week.     FOLLOW-UP:   Return if symptoms worsen or fail to improve.    Total time spent for the day for this patient unrelated to procedure time is: 38 minutes. I spent 29 minutes in face to face time and I spent 4 minutes pre-charting and 5 minutes in post-visit documentation.      Dr. Troy Neff D.O.  [facility]

## 2023-09-08 NOTE — PATIENT INSTRUCTIONS
Your goal LDL cholesterol is < 70   Recheck cholesterol 2 months after the TIA, approximately 10/19/23    Continue aspirin 81mg daily. This is a indefinite therapy   Continue atorvastatin 40mg daily.     Your goal blood pressure is < 140/90 for long-term control.     I have ordered a Ziopatch monitor

## 2023-09-26 ENCOUNTER — TELEPHONE (OUTPATIENT)
Dept: CARDIOLOGY | Facility: MEDICAL CENTER | Age: 58
End: 2023-09-26
Payer: COMMERCIAL

## 2023-09-26 DIAGNOSIS — G45.9 TRANSIENT ISCHEMIC ATTACK (TIA): ICD-10-CM

## 2023-09-27 PROCEDURE — 93228 REMOTE 30 DAY ECG REV/REPORT: CPT | Performed by: INTERNAL MEDICINE

## 2023-11-17 ENCOUNTER — NON-PROVIDER VISIT (OUTPATIENT)
Dept: CARDIOLOGY | Facility: MEDICAL CENTER | Age: 58
End: 2023-11-17
Attending: INTERNAL MEDICINE
Payer: COMMERCIAL

## 2023-11-17 ENCOUNTER — OFFICE VISIT (OUTPATIENT)
Dept: CARDIOLOGY | Facility: MEDICAL CENTER | Age: 58
End: 2023-11-17
Attending: PSYCHIATRY & NEUROLOGY
Payer: COMMERCIAL

## 2023-11-17 VITALS
HEIGHT: 66 IN | BODY MASS INDEX: 38.25 KG/M2 | HEART RATE: 86 BPM | SYSTOLIC BLOOD PRESSURE: 116 MMHG | OXYGEN SATURATION: 100 % | RESPIRATION RATE: 14 BRPM | DIASTOLIC BLOOD PRESSURE: 88 MMHG | WEIGHT: 238 LBS

## 2023-11-17 DIAGNOSIS — I49.8 VENTRICULAR BIGEMINY: ICD-10-CM

## 2023-11-17 DIAGNOSIS — I10 ESSENTIAL HYPERTENSION: ICD-10-CM

## 2023-11-17 DIAGNOSIS — I49.1 PREMATURE ATRIAL CONTRACTIONS: ICD-10-CM

## 2023-11-17 DIAGNOSIS — I49.3 PVCS (PREMATURE VENTRICULAR CONTRACTIONS): ICD-10-CM

## 2023-11-17 DIAGNOSIS — E78.5 DYSLIPIDEMIA: ICD-10-CM

## 2023-11-17 DIAGNOSIS — G45.9 TIA (TRANSIENT ISCHEMIC ATTACK): ICD-10-CM

## 2023-11-17 DIAGNOSIS — R07.9 CHEST PAIN, UNSPECIFIED TYPE: ICD-10-CM

## 2023-11-17 DIAGNOSIS — I47.10 SVT (SUPRAVENTRICULAR TACHYCARDIA) (HCC): ICD-10-CM

## 2023-11-17 LAB — EKG IMPRESSION: NORMAL

## 2023-11-17 PROCEDURE — 3074F SYST BP LT 130 MM HG: CPT | Performed by: INTERNAL MEDICINE

## 2023-11-17 PROCEDURE — 99204 OFFICE O/P NEW MOD 45 MIN: CPT | Performed by: INTERNAL MEDICINE

## 2023-11-17 PROCEDURE — 3079F DIAST BP 80-89 MM HG: CPT | Performed by: INTERNAL MEDICINE

## 2023-11-17 PROCEDURE — 93005 ELECTROCARDIOGRAM TRACING: CPT | Performed by: INTERNAL MEDICINE

## 2023-11-17 PROCEDURE — 99212 OFFICE O/P EST SF 10 MIN: CPT | Performed by: INTERNAL MEDICINE

## 2023-11-17 PROCEDURE — 93010 ELECTROCARDIOGRAM REPORT: CPT | Performed by: INTERNAL MEDICINE

## 2023-11-17 PROCEDURE — 93246 EXT ECG>7D<15D RECORDING: CPT

## 2023-11-17 ASSESSMENT — ENCOUNTER SYMPTOMS
NEUROLOGICAL NEGATIVE: 1
CONSTITUTIONAL NEGATIVE: 1
ABDOMINAL PAIN: 0
DOUBLE VISION: 0
DEPRESSION: 0
MYALGIAS: 0
BRUISES/BLEEDS EASILY: 0
HEADACHES: 0
NAUSEA: 0
SHORTNESS OF BREATH: 0
FEVER: 0
PSYCHIATRIC NEGATIVE: 1
NERVOUS/ANXIOUS: 0
CHILLS: 0
FOCAL WEAKNESS: 0
BLURRED VISION: 0
EYES NEGATIVE: 1
DIZZINESS: 0
CLAUDICATION: 0
CARDIOVASCULAR NEGATIVE: 1
WEAKNESS: 0
GASTROINTESTINAL NEGATIVE: 1
RESPIRATORY NEGATIVE: 1
COUGH: 0
VOMITING: 0
MUSCULOSKELETAL NEGATIVE: 1
PALPITATIONS: 0
WEIGHT LOSS: 0

## 2023-11-17 ASSESSMENT — FIBROSIS 4 INDEX: FIB4 SCORE: 0.93

## 2023-11-17 NOTE — PROGRESS NOTES
Patient enrolled in the 14 day o Holter monitoring program per Frank Chris MD.  >Office hook-up, serial # [id number].  >Currently pending EOS.

## 2023-11-17 NOTE — PROGRESS NOTES
Chief Complaint   Patient presents with    Hypertension     NP DX:Essential hypertension    Chest Pain     NP       Subjective     Gely Pantoja is a 58 y.o. female who presents today as a consult from Shabbir Gan for transient ischemic attack.    Thank you for allowing me to evaluate Mrs. Pantoja, who as you know is a 58 year old female with hypertension and hyperlipidemia, recent transient ischemic attack, lifelong nonsmoker, no family history of coronary artery disease. She is clinically doing well. She denies chest pain, shortness of breath, palpitations, nausea/vomiting or diaphoresis. She has been active walking.     Past Medical History:   Diagnosis Date    Back pain     Hyperlipidemia     Hypertension      Past Surgical History:   Procedure Laterality Date    CERVICAL DISK AND FUSION ANTERIOR      FUSION, SPINE, LUMBAR, PLIF       History reviewed. No pertinent family history.  Social History     Socioeconomic History    Marital status:      Spouse name: Not on file    Number of children: Not on file    Years of education: Not on file    Highest education level: Not on file   Occupational History    Not on file   Tobacco Use    Smoking status: Never    Smokeless tobacco: Never   Vaping Use    Vaping Use: Never used   Substance and Sexual Activity    Alcohol use: Not Currently     Comment: 1 glass of wine per month    Drug use: Never    Sexual activity: Not on file   Other Topics Concern    Not on file   Social History Narrative    Not on file     Social Determinants of Health     Financial Resource Strain: Not on file   Food Insecurity: Not on file   Transportation Needs: Not on file   Physical Activity: Not on file   Stress: Not on file   Social Connections: Not on file   Intimate Partner Violence: Not on file   Housing Stability: Not on file     No Known Allergies    (Medications reviewed.)  Outpatient Encounter Medications as of 11/17/2023   Medication Sig Dispense Refill    aspirin 81  "MG EC tablet Take 1 Tablet by mouth every day. 100 Tablet 0    amLODIPine (NORVASC) 10 MG Tab Take 1 Tablet by mouth every day. 30 Tablet 0    atorvastatin (LIPITOR) 40 MG Tab Take 1 Tablet by mouth every evening. 30 Tablet 0    hydroCHLOROthiazide (HYDRODIURIL) 12.5 MG tablet Take 1 Tablet by mouth every day. 30 Tablet 0    lisinopril (PRINIVIL) 10 MG Tab Take 1 Tablet by mouth every day. 30 Tablet 0    Rimegepant Sulfate 75 MG TABLET DISPERSIBLE Take 1 Tablet by mouth every day. (Patient not taking: Reported on 9/8/2023) 10 Tablet 0     No facility-administered encounter medications on file as of 11/17/2023.     Review of Systems   Constitutional: Negative.  Negative for chills, fever, malaise/fatigue and weight loss.   HENT: Negative.  Negative for hearing loss.    Eyes: Negative.  Negative for blurred vision and double vision.   Respiratory: Negative.  Negative for cough and shortness of breath.    Cardiovascular: Negative.  Negative for chest pain, palpitations, claudication and leg swelling.   Gastrointestinal: Negative.  Negative for abdominal pain, nausea and vomiting.   Genitourinary: Negative.  Negative for dysuria and urgency.   Musculoskeletal: Negative.  Negative for joint pain and myalgias.   Skin: Negative.  Negative for itching and rash.   Neurological: Negative.  Negative for dizziness, focal weakness, weakness and headaches.   Endo/Heme/Allergies: Negative.  Does not bruise/bleed easily.   Psychiatric/Behavioral: Negative.  Negative for depression. The patient is not nervous/anxious.               Objective     /88 (BP Location: Left arm, Patient Position: Sitting, BP Cuff Size: Adult)   Pulse 86   Resp 14   Ht 1.676 m (5' 6\")   Wt 108 kg (238 lb)   SpO2 100%   BMI 38.41 kg/m²     Physical Exam  Constitutional:       Appearance: Normal appearance. She is well-developed and normal weight.   HENT:      Head: Normocephalic and atraumatic.   Neck:      Vascular: No JVD.   Cardiovascular:    "   Rate and Rhythm: Normal rate and regular rhythm.      Heart sounds: Normal heart sounds.   Pulmonary:      Effort: Pulmonary effort is normal.      Breath sounds: Normal breath sounds.   Abdominal:      General: Bowel sounds are normal.      Palpations: Abdomen is soft.      Comments: No hepatosplenomegaly.   Musculoskeletal:         General: Normal range of motion.   Lymphadenopathy:      Cervical: No cervical adenopathy.   Skin:     General: Skin is warm and dry.   Neurological:      Mental Status: She is alert and oriented to person, place, and time.            CARDIAC STUDIES/PROCEDURES:    CTA OF HEAD (08/18/23)  1.  CT angiogram of the [place] demonstrating no large vessel occlusion.   (study result reviewed)    CTA OF NECK (08/18/23)  1.  No significant stenosis or occlusion of the internal carotid arteries.   (study result reviewed)     ECHOCARDIOGRAM CONCLUSIONS (08/19/23)  Normal chamber sizes. Normal LV and RV systolic function. No   significant valvular disease. Bubble study negative fo R to L shunt.  (study result reviewed)     ECHOCARDIOGRAM CONCLUSIONS (06/30/23)  Normal left ventricular systolic function.  Normal diastolic function.  (study result reviewed)     EKG performed on (08/18/23) was reviewed: EKG personally interpreted shows sinus rhythm.     Laboratory results of (08/19/23) were reviewed. Cholesterol profile of 173/215/47/83 mg/dL noted.     MYOCARDIAL PERFUSION STUDY CONCLUSIONS (06/30/23)  NUCLEAR IMAGING INTERPRETATION  Abnormal MPI with mild to moderate stress induced ischemia affecting the apical septum.   SDS=2  Normal left ventricular size, ejection fraction, and wall motion.  ECG INTERPRETATION  Negative stress ECG for ischemia.  (study result reviewed)    MRI OF BRAIN (08/19/23)  1.  Minimal supratentorial white matter disease. Nonspecific findings most consistent with microvascular ischemic change. Common findings for the patient's age. Doubtful clinical  significance.  2.  No evidence of acute infarction, hemorrhage, or mass lesion.  (study result reviewed)     ZIO PATCH REPORT (09/08/23-09/21/23)   Zio Patch study showing predominately sinus rhythm with maximum rate of 197 bpm, minimum rate of 48 bpm, average of 77 bpm.   Atrial fibrillation: None.   Supraventricular tachycardia: 22 episodes of supraventricular tachycardia/atrial tachycardia with fastest interval lasting 4 beats with a max rate of 197 bpm with longest lasting 15 beats with average rate of 103 bpm noted.   Pauses: None.   Heart block: None.   Ventricular tachycardia: None.   Rare <1% burden of premature atrial contractions, rare couplets and triplets .   Rare <1% burden of premature ventricular contractions.       Assessment & Plan     1. Chest pain, unspecified type  EKG      2. TIA (transient ischemic attack)        3. Essential hypertension        4. Dyslipidemia            Medical Decision Making: Today's Assessment/Status/Plan:        History of trans-ischemic attack: She remains clinically stable without any new neurological symptoms. Repeat Zio monitor.  Hypertension: Blood pressure is well controlled. We will continue with amlodipine, hydrochlorothiazide, lisinopril.  Hyperlipidemia: She is doing well on statin therapy without myalgia symptoms.    We will follow up in 2 months.    CC Philip Winslow    Additional information:   No need for Implantable Loop Recorder placement per Shabbir Gan.    Frank Chris MD,[facility]

## 2023-11-30 ENCOUNTER — TELEPHONE (OUTPATIENT)
Dept: CARDIOLOGY | Facility: MEDICAL CENTER | Age: 58
End: 2023-11-30
Payer: COMMERCIAL

## 2023-12-01 NOTE — TELEPHONE ENCOUNTER
----- Message from Frank Chris M.D. sent at 11/30/2023 12:04 PM PST -----  Please notify the patient of message from neurology. Thank you.  MAGED.   ----- Message -----  From: Shabbir Neff D.O.  Sent: 11/29/2023   4:43 PM PST  To: LASHANDA Davila,   There is no need for ILR in this case.     Dr. Troy Neff D.O.  [facility]        ----- Message -----  From: Frank Chris M.D.  Sent: 11/17/2023   9:22 AM PST  To: Bailey Peterson R.N.; Shabbir Neff D.O.    I established with this patient with TIA. Was she sent to cardiology for ILR placement or just general establishment for hypertension and hyperlipidemia. Thank you.  ELMER

## 2023-12-01 NOTE — TELEPHONE ENCOUNTER
Upon chart review, pt is active on [employer].  Last login 11/28/2023.    [employer] message sent to pt regarding JI recommendations.

## 2023-12-08 ENCOUNTER — TELEPHONE (OUTPATIENT)
Dept: CARDIOLOGY | Facility: MEDICAL CENTER | Age: 58
End: 2023-12-08
Payer: COMMERCIAL

## 2023-12-11 PROCEDURE — 93248 EXT ECG>7D<15D REV&INTERPJ: CPT | Performed by: INTERNAL MEDICINE

## 2023-12-12 NOTE — TELEPHONE ENCOUNTER
Phone Number Called: [phone]     Call outcome: Did not leave a detailed message. Requested patient to call back.    Message: Called to inform patient of JI recommendations. Unable to reach. Left VM for patient to call back when able.

## 2023-12-14 NOTE — TELEPHONE ENCOUNTER
Phone Number Called: [phone]     Call outcome: Left detailed message for patient. Informed to call back with any additional questions.    Message: Called to inform patient of JI recommendations. Unable to reach. Left VM stating this was regarding her heart monitor results as patient's VM said first and last name. Informed to call back when able.   -------------------------    Frank Chris M.D.  You2 days ago      Zio showing no atrial fibrillation. No further recommendations. Thank you.  MAGED.

## 2023-12-19 NOTE — TELEPHONE ENCOUNTER
Called pt, [phone], to review JI recommendations.   unable to reach.  Left voicemail to return this call at their earliest convenience.

## 2024-01-12 ENCOUNTER — APPOINTMENT (OUTPATIENT)
Dept: CARDIOLOGY | Facility: MEDICAL CENTER | Age: 59
End: 2024-01-12
Attending: INTERNAL MEDICINE
Payer: COMMERCIAL

## 2025-03-13 NOTE — PROGRESS NOTES
12 hour chart check complete.     Monitor Summary  Rhythm: SR  HR: 70s  Ectopy: PVCs  MT: 0.14  QRS: 0.08  QT: 0.40   Dizziness